# Patient Record
Sex: MALE | Race: WHITE | NOT HISPANIC OR LATINO | Employment: FULL TIME | URBAN - METROPOLITAN AREA
[De-identification: names, ages, dates, MRNs, and addresses within clinical notes are randomized per-mention and may not be internally consistent; named-entity substitution may affect disease eponyms.]

---

## 2017-01-17 DIAGNOSIS — K20.90 ESOPHAGITIS: ICD-10-CM

## 2017-01-20 ENCOUNTER — TELEPHONE (OUTPATIENT)
Dept: ELECTROPHYSIOLOGY | Facility: CLINIC | Age: 77
End: 2017-01-20

## 2017-01-20 DIAGNOSIS — I25.5 ISCHEMIC CARDIOMYOPATHY: ICD-10-CM

## 2017-01-20 DIAGNOSIS — E78.5 HYPERLIPIDEMIA, UNSPECIFIED HYPERLIPIDEMIA TYPE: Primary | ICD-10-CM

## 2017-01-20 RX ORDER — OMEPRAZOLE 40 MG/1
CAPSULE, DELAYED RELEASE ORAL
Qty: 30 CAPSULE | Refills: 1 | OUTPATIENT
Start: 2017-01-20

## 2017-01-20 RX ORDER — ATORVASTATIN CALCIUM 40 MG/1
40 TABLET, FILM COATED ORAL NIGHTLY
Qty: 30 TABLET | Refills: 3 | Status: SHIPPED | OUTPATIENT
Start: 2017-01-20

## 2017-01-20 RX ORDER — METOPROLOL TARTRATE 50 MG/1
50 TABLET ORAL 2 TIMES DAILY
Qty: 60 TABLET | Refills: 3 | Status: SHIPPED | OUTPATIENT
Start: 2017-01-20 | End: 2017-09-25 | Stop reason: SDUPTHER

## 2017-01-21 DIAGNOSIS — I48.92 ATRIAL FLUTTER, UNSPECIFIED TYPE: Primary | ICD-10-CM

## 2017-01-21 DIAGNOSIS — E78.5 HYPERLIPIDEMIA, UNSPECIFIED HYPERLIPIDEMIA TYPE: ICD-10-CM

## 2017-01-23 RX ORDER — METOPROLOL TARTRATE 50 MG/1
50 TABLET ORAL 2 TIMES DAILY
Qty: 60 TABLET | Refills: 3 | Status: SHIPPED | OUTPATIENT
Start: 2017-01-23 | End: 2017-03-16 | Stop reason: SDUPTHER

## 2017-01-23 RX ORDER — ATORVASTATIN CALCIUM 40 MG/1
40 TABLET, FILM COATED ORAL NIGHTLY
Qty: 30 TABLET | Refills: 3 | Status: SHIPPED | OUTPATIENT
Start: 2017-01-23 | End: 2017-03-16 | Stop reason: SDUPTHER

## 2017-01-26 DIAGNOSIS — F41.9 ANXIETY: ICD-10-CM

## 2017-01-26 RX ORDER — ALPRAZOLAM 0.25 MG/1
TABLET ORAL
Qty: 90 TABLET | Refills: 0 | OUTPATIENT
Start: 2017-01-26

## 2017-01-31 DIAGNOSIS — F41.9 ANXIETY: ICD-10-CM

## 2017-01-31 RX ORDER — ALPRAZOLAM 0.25 MG/1
TABLET ORAL
Qty: 90 TABLET | Refills: 0 | OUTPATIENT
Start: 2017-01-31

## 2017-02-15 ENCOUNTER — CLINICAL SUPPORT (OUTPATIENT)
Dept: SURGERY | Facility: CLINIC | Age: 77
End: 2017-02-15
Payer: MEDICARE

## 2017-02-15 DIAGNOSIS — K94.13 MALFUNCTIONING JEJUNOSTOMY TUBE: Primary | ICD-10-CM

## 2017-02-15 DIAGNOSIS — C15.9 ESOPHAGEAL ADENOCARCINOMA: ICD-10-CM

## 2017-02-15 PROCEDURE — 99024 POSTOP FOLLOW-UP VISIT: CPT | Mod: S$GLB,,, | Performed by: NURSE PRACTITIONER

## 2017-02-15 NOTE — PROGRESS NOTES
"Replaced 12 Fr feeding tube as tube cracked- no suture required. Pt is doing well- has gained weight, feeling well and energetic although he feels like his swallow is "clsoing up a bit". He is able to get liquids and some soft foods down- uses J tube daily for most of his nutritional needs. We discussed his last conversation w Dr. Caceres about him being a very high risk for surgery due to his heart issues and he stated that Dr. Caceres clearly spelled out all the risks and that he made the decision to not proceed w resection due to those risks. Mr. Monsalve has maintained his activity levels and is feeling and looking much better than his pre-op appts. No walker required today.     ASSESSMENT:    J tube replacement tolerated well.     PLAN:    1. Follow up PRN  2. Will send a note to Dr. Howard to ask her to follow him as he was a patient of Dr. Rae's.      "

## 2017-02-15 NOTE — Clinical Note
Hi Dr. Howard and Ofelia,  This patient was Dr. Rae's and he had a whopping case of radiation esophagitis and suffered quite a bit afterward. He is not a surgical candidate due to his heart function but he is very ambulatory and doing really well.  Could you see him Dr. Howard for follow up soon? I know you are extremely busy but I am very fond of him and he worked very hard to try to get to surgery. He hasn't had an appointment for a while w medical oncology. If there is another provider, please advise.  Thanks,  Pau

## 2017-03-06 DIAGNOSIS — F41.9 ANXIETY: ICD-10-CM

## 2017-03-06 RX ORDER — ALPRAZOLAM 0.25 MG/1
TABLET ORAL
Qty: 90 TABLET | Refills: 0 | Status: SHIPPED | OUTPATIENT
Start: 2017-03-06 | End: 2017-04-05 | Stop reason: SDUPTHER

## 2017-03-06 RX ORDER — ALPRAZOLAM 0.25 MG/1
TABLET ORAL
Qty: 90 TABLET | Refills: 0 | OUTPATIENT
Start: 2017-03-06

## 2017-03-06 NOTE — TELEPHONE ENCOUNTER
----- Message from Ghislaine Vick sent at 3/6/2017  3:44 PM CST -----  Contact: Pt  Pt called to speak to the nurse to request an rx refill for alprazolam (XANAX) 0.25 MG tablet and would the rx sent to [facility] on [address] [phone].    Pt can be reached at [phone].    Thanks

## 2017-03-16 ENCOUNTER — LAB VISIT (OUTPATIENT)
Dept: LAB | Facility: HOSPITAL | Age: 77
End: 2017-03-16
Payer: MEDICARE

## 2017-03-16 ENCOUNTER — OFFICE VISIT (OUTPATIENT)
Dept: HEMATOLOGY/ONCOLOGY | Facility: CLINIC | Age: 77
End: 2017-03-16
Payer: MEDICARE

## 2017-03-16 VITALS
RESPIRATION RATE: 18 BRPM | WEIGHT: 170.88 LBS | TEMPERATURE: 98 F | HEART RATE: 66 BPM | DIASTOLIC BLOOD PRESSURE: 60 MMHG | SYSTOLIC BLOOD PRESSURE: 94 MMHG | OXYGEN SATURATION: 98 % | BODY MASS INDEX: 23.14 KG/M2 | HEIGHT: 72 IN

## 2017-03-16 DIAGNOSIS — C15.5 MALIGNANT NEOPLASM OF LOWER THIRD OF ESOPHAGUS: ICD-10-CM

## 2017-03-16 DIAGNOSIS — Z85.01 HISTORY OF ESOPHAGEAL CANCER: Primary | ICD-10-CM

## 2017-03-16 LAB
ALBUMIN SERPL BCP-MCNC: 3.3 G/DL
ALP SERPL-CCNC: 97 U/L
ALT SERPL W/O P-5'-P-CCNC: 24 U/L
ANION GAP SERPL CALC-SCNC: 6 MMOL/L
AST SERPL-CCNC: 30 U/L
BILIRUB SERPL-MCNC: 0.9 MG/DL
BUN SERPL-MCNC: 26 MG/DL
CALCIUM SERPL-MCNC: 9.1 MG/DL
CHLORIDE SERPL-SCNC: 104 MMOL/L
CO2 SERPL-SCNC: 28 MMOL/L
CREAT SERPL-MCNC: 0.8 MG/DL
ERYTHROCYTE [DISTWIDTH] IN BLOOD BY AUTOMATED COUNT: 14.4 %
EST. GFR  (AFRICAN AMERICAN): >60 ML/MIN/1.73 M^2
EST. GFR  (NON AFRICAN AMERICAN): >60 ML/MIN/1.73 M^2
GLUCOSE SERPL-MCNC: 122 MG/DL
HCT VFR BLD AUTO: 41.7 %
HGB BLD-MCNC: 13.8 G/DL
MCH RBC QN AUTO: 31.7 PG
MCHC RBC AUTO-ENTMCNC: 33.1 %
MCV RBC AUTO: 96 FL
NEUTROPHILS # BLD AUTO: 5.1 K/UL
PLATELET # BLD AUTO: 140 K/UL
PMV BLD AUTO: 10.4 FL
POTASSIUM SERPL-SCNC: 4.7 MMOL/L
PROT SERPL-MCNC: 6.6 G/DL
RBC # BLD AUTO: 4.36 M/UL
SODIUM SERPL-SCNC: 138 MMOL/L
WBC # BLD AUTO: 6.71 K/UL

## 2017-03-16 PROCEDURE — 3078F DIAST BP <80 MM HG: CPT | Mod: S$GLB,,, | Performed by: INTERNAL MEDICINE

## 2017-03-16 PROCEDURE — 1126F AMNT PAIN NOTED NONE PRSNT: CPT | Mod: S$GLB,,, | Performed by: INTERNAL MEDICINE

## 2017-03-16 PROCEDURE — 3074F SYST BP LT 130 MM HG: CPT | Mod: S$GLB,,, | Performed by: INTERNAL MEDICINE

## 2017-03-16 PROCEDURE — 1157F ADVNC CARE PLAN IN RCRD: CPT | Mod: S$GLB,,, | Performed by: INTERNAL MEDICINE

## 2017-03-16 PROCEDURE — 1160F RVW MEDS BY RX/DR IN RCRD: CPT | Mod: S$GLB,,, | Performed by: INTERNAL MEDICINE

## 2017-03-16 PROCEDURE — 1159F MED LIST DOCD IN RCRD: CPT | Mod: S$GLB,,, | Performed by: INTERNAL MEDICINE

## 2017-03-16 PROCEDURE — 99999 PR PBB SHADOW E&M-EST. PATIENT-LVL III: CPT | Mod: PBBFAC,,, | Performed by: INTERNAL MEDICINE

## 2017-03-16 PROCEDURE — 99214 OFFICE O/P EST MOD 30 MIN: CPT | Mod: S$GLB,,, | Performed by: INTERNAL MEDICINE

## 2017-03-16 NOTE — PROGRESS NOTES
"Subjective:       Patient ID: Roni Monsalve Jr. is a 77 y.o. male.    Chief Complaint: Malignant neoplasm of lower third of esophagus  Patient is a long time smoker.  Had an EGD 1/28/2016 which showed him to have a 2.5 cms submucosal mass at the GE junction. This was biopsied and was consistent with an infiltrating poorly differentiated adenocarcinoma. An EUS done subsequently showed a T3 lesion and no significant lymphadenopathy.  Received concomitant chemotherapy (weekly carboplatin and paclitaxel) and XRT.  Surgery was not done due to cardiac issues.  EGD revealed "Normal oropharynx. One mild to moderate stenosis was found 43 cm from the incisors. This measured 1 cm (in length) and was traversed. An esophageal stent was not placed due to the high likelihood of migration. Diffuse moderate mucosal changes characterized by congestion, scalloping and ulceration were found in the lower third of the esophagus form 44 cm to 39 cm.  Last PET scan in 10/16 revealed "Distal esophageal activity fairly similar.  This could be primary neoplasm and or esophagitis.  No metastases are seen".    HPIHe feels well and receives nutrition via J tube. He notes that he tries to drink a boost or 2/day. He also notes that he has no taste and smell.  He has not had scans after October 2016. He saw Dr. Rae in 10/16 and was recommended to undergo CT scans which he has not done so far. He notes that he knows that nothing much can be done if he can cancer.      Review of Systems   Constitutional: Negative for appetite change, fatigue and unexpected weight change.   HENT: Negative for mouth sores.    Eyes: Negative for visual disturbance.   Respiratory: Negative for cough and shortness of breath.    Cardiovascular: Negative for chest pain.   Gastrointestinal: Negative for abdominal pain and diarrhea.   Genitourinary: Negative for frequency.   Musculoskeletal: Negative for back pain.   Skin: Negative for rash.   Neurological: Negative for " headaches.   Hematological: Negative for adenopathy.   Psychiatric/Behavioral: The patient is not nervous/anxious.    All other systems reviewed and are negative.      PMFSH: all information reviewed and updated as relevant to today's visit  Objective:      Physical Exam   Constitutional: He is oriented to person, place, and time. He appears well-developed and well-nourished.   HENT:   Mouth/Throat: No oropharyngeal exudate.   Cardiovascular: Normal rate and normal heart sounds.    Pulmonary/Chest: Effort normal and breath sounds normal. He has no wheezes.   Abdominal: Soft. Bowel sounds are normal. There is no tenderness.   Musculoskeletal: He exhibits no edema or tenderness.   Lymphadenopathy:     He has no cervical adenopathy.   Neurological: He is alert and oriented to person, place, and time. Coordination normal.   Skin: Skin is warm and dry. No rash noted.   Psychiatric: He has a normal mood and affect. Judgment and thought content normal.   Vitals reviewed.      LABS:  WBC   Date Value Ref Range Status   03/16/2017 6.71 3.90 - 12.70 K/uL Final     Hemoglobin   Date Value Ref Range Status   03/16/2017 13.8 (L) 14.0 - 18.0 g/dL Final     Hematocrit   Date Value Ref Range Status   03/16/2017 41.7 40.0 - 54.0 % Final     Platelets   Date Value Ref Range Status   03/16/2017 140 (L) 150 - 350 K/uL Final     Gran #   Date Value Ref Range Status   03/16/2017 5.1 1.8 - 7.7 K/uL Final     Comment:     The ANC is based on a white cell differential from an   automated cell counter. It has not been microscopically   reviewed for the presence of abnormal cells. Clinical   correlation is required.         Chemistry        Component Value Date/Time     10/18/2016 1422    K 4.6 10/18/2016 1422     10/18/2016 1422    CO2 28 03/16/2017 0919    BUN 27 (H) 10/18/2016 1422    CREATININE 0.7 10/18/2016 1422    GLU 95 10/18/2016 1422        Component Value Date/Time    CALCIUM 9.0 10/18/2016 1422    ALKPHOS 97 10/18/2016  1422    AST 33 10/18/2016 1422    ALT 33 10/18/2016 1422    BILITOT 0.7 10/18/2016 1422          Assessment:       1. History of esophageal cancer        Plan:        1. Discussed with him that he may not be able to undergo surgery but he maybe able to undergo chemo for recurrence. Recommended him to undergo PET scan as he cannot drink contrast. He notes that he will talk to his wife and let me know./    Further follow-up will be based on that    Above care plan was discussed with patient and all questions were addressed to his satisfaction

## 2017-03-22 ENCOUNTER — TELEPHONE (OUTPATIENT)
Dept: HEMATOLOGY/ONCOLOGY | Facility: CLINIC | Age: 77
End: 2017-03-22

## 2017-03-22 DIAGNOSIS — Z85.01 HISTORY OF ESOPHAGEAL CANCER: Primary | ICD-10-CM

## 2017-03-22 NOTE — TELEPHONE ENCOUNTER
----- Message from Justin Cho sent at 3/22/2017  2:19 PM CDT -----  Contact: PT  Would like a call to speak with physician, regarding his medication.     Call: [phone]

## 2017-03-22 NOTE — TELEPHONE ENCOUNTER
Returned call to patient, who is calling to state he hasn't worn his home oxygen since last year, and he is needing a DC home oxygen order to present to the company (so they will  equipment).     Nurse informed patient she would contact him back after speaking with dr boswell.  Patient voiced understanding.      Message routed to dr boswell

## 2017-03-23 ENCOUNTER — TELEPHONE (OUTPATIENT)
Dept: HEMATOLOGY/ONCOLOGY | Facility: CLINIC | Age: 77
End: 2017-03-23

## 2017-03-23 NOTE — TELEPHONE ENCOUNTER
Received consult from Dr. Howard re: d/c'ing patient's home oxygen. Downloaded form from [employer] website and completed it. Faxed this form along with the order to d/c home oxygen and patient's demographic information to [employer] at [phone]. Called patient at [phone] and discussed arrangements with him. Explained that [employer]'s staff will process the order and notify the equipment company, and patient should receive call from the equipment company staff to schedule  of the oxygen equipment from his home. Advised patient to call me back by the middle of next week if he doesn't receive call(s) from the equipment company/[employer], and patient stated understanding. He reported no other needs or concerns at this time.

## 2017-04-05 DIAGNOSIS — F41.9 ANXIETY: ICD-10-CM

## 2017-04-05 RX ORDER — ALPRAZOLAM 0.25 MG/1
TABLET ORAL
Qty: 90 TABLET | Refills: 0 | Status: SHIPPED | OUTPATIENT
Start: 2017-04-05 | End: 2017-05-04 | Stop reason: SDUPTHER

## 2017-04-18 ENCOUNTER — TELEPHONE (OUTPATIENT)
Dept: SURGERY | Facility: CLINIC | Age: 77
End: 2017-04-18

## 2017-04-18 ENCOUNTER — TELEPHONE (OUTPATIENT)
Dept: HEMATOLOGY/ONCOLOGY | Facility: CLINIC | Age: 77
End: 2017-04-18

## 2017-04-18 NOTE — TELEPHONE ENCOUNTER
----- Message from Magaly Duggan sent at 4/18/2017  9:07 AM CDT -----  Contact: Self  Pt needs Nurse to call him [phone].

## 2017-04-18 NOTE — TELEPHONE ENCOUNTER
----- Message from Stalin Zayas sent at 4/18/2017 12:30 PM CDT -----  Patient states that he needs to speak with nurse in ref to which lubricant he can use to reinsert in tube;or if he could stop by to pick something up//please call back at [phone]//thank you

## 2017-04-18 NOTE — TELEPHONE ENCOUNTER
Returned call to patient, who is calling to check the status of his DC home oxygen orders.  Nurse informed patient dr boswell wrote the orders and nurse would follow up with nj oakley.  Patient voiced understanding.    Message routed to nj oakley

## 2017-04-19 NOTE — TELEPHONE ENCOUNTER
----- Message from Justin Cho sent at 4/19/2017 12:57 PM CDT -----  Contact: PT  Need clinical notes and progress note faxed over for his J Tube. He need it to be faxed before 2pm, before closing time.     He need it in order to receive his food. Please fax it over today for patient.     Fax: [phone] ( Attention: Denis)      Please call patient once it has been completed, [phone]

## 2017-05-04 DIAGNOSIS — F41.9 ANXIETY: ICD-10-CM

## 2017-05-04 RX ORDER — ALPRAZOLAM 0.25 MG/1
TABLET ORAL
Qty: 90 TABLET | Refills: 0 | Status: SHIPPED | OUTPATIENT
Start: 2017-05-04

## 2017-05-05 ENCOUNTER — TELEPHONE (OUTPATIENT)
Dept: HEMATOLOGY/ONCOLOGY | Facility: CLINIC | Age: 77
End: 2017-05-05

## 2017-05-05 ENCOUNTER — DOCUMENTATION ONLY (OUTPATIENT)
Dept: HEMATOLOGY/ONCOLOGY | Facility: CLINIC | Age: 77
End: 2017-05-05

## 2017-05-05 NOTE — TELEPHONE ENCOUNTER
----- Message from Elle Nunez sent at 5/5/2017  3:00 PM CDT -----  Contact: Cammy/[employer] [phone]  Ms. Cammy called and would like to speak to Ofelia in reference to a prior authorization.    Ms. Chawla can be reached at [phone]      Thank you

## 2017-05-05 NOTE — TELEPHONE ENCOUNTER
----- Message from Justin Cho sent at 5/5/2017  9:31 AM CDT -----  Contact: [facility]   alprazolam (XANAX) 0.25 MG tablet requires a PA.    She faxed over the information and need it to be faxed back to her today before 12pm.    Call: [phone]  Fax: [phone]

## 2017-05-05 NOTE — TELEPHONE ENCOUNTER
Provided samuel with previous medications tried for anxiety---none that nurse is aware of.  Dr. Howard is aware of risk of medication.

## 2017-05-05 NOTE — TELEPHONE ENCOUNTER
Left message for patient informing him xanax is not covered by his insurance anymore--as it is considered high risk for him.  Advised patient to contact his PCP or psychiatrist so that something else may be prescribed if he can't afford the out of pocket cost.

## 2017-05-11 DIAGNOSIS — I48.92 ATRIAL FLUTTER, UNSPECIFIED TYPE: Primary | ICD-10-CM

## 2017-05-11 RX ORDER — AMIODARONE HYDROCHLORIDE 200 MG/1
200 TABLET ORAL DAILY
Qty: 90 TABLET | Refills: 3 | Status: SHIPPED | OUTPATIENT
Start: 2017-05-11

## 2017-05-19 ENCOUNTER — OFFICE VISIT (OUTPATIENT)
Dept: SURGERY | Facility: CLINIC | Age: 77
End: 2017-05-19
Payer: MEDICARE

## 2017-05-19 ENCOUNTER — HOSPITAL ENCOUNTER (EMERGENCY)
Facility: HOSPITAL | Age: 77
Discharge: HOME | End: 2017-05-19
Attending: EMERGENCY MEDICINE
Payer: MEDICARE

## 2017-05-19 ENCOUNTER — TELEPHONE (OUTPATIENT)
Dept: HEMATOLOGY/ONCOLOGY | Facility: CLINIC | Age: 77
End: 2017-05-19

## 2017-05-19 ENCOUNTER — TELEPHONE (OUTPATIENT)
Dept: SURGERY | Facility: CLINIC | Age: 77
End: 2017-05-19

## 2017-05-19 VITALS
WEIGHT: 170 LBS | BODY MASS INDEX: 23.03 KG/M2 | TEMPERATURE: 97 F | HEART RATE: 118 BPM | OXYGEN SATURATION: 95 % | HEIGHT: 72 IN | RESPIRATION RATE: 16 BRPM | DIASTOLIC BLOOD PRESSURE: 63 MMHG | TEMPERATURE: 98 F | HEART RATE: 62 BPM | BODY MASS INDEX: 22.81 KG/M2 | WEIGHT: 168.44 LBS | DIASTOLIC BLOOD PRESSURE: 70 MMHG | SYSTOLIC BLOOD PRESSURE: 103 MMHG | HEIGHT: 72 IN | SYSTOLIC BLOOD PRESSURE: 116 MMHG

## 2017-05-19 DIAGNOSIS — M54.9 BACK PAIN: ICD-10-CM

## 2017-05-19 DIAGNOSIS — R53.1 WEAKNESS GENERALIZED: ICD-10-CM

## 2017-05-19 DIAGNOSIS — K94.13 MALFUNCTIONING JEJUNOSTOMY TUBE: Primary | ICD-10-CM

## 2017-05-19 DIAGNOSIS — T85.528A JEJUNOSTOMY TUBE FELL OUT: Primary | ICD-10-CM

## 2017-05-19 LAB
ALBUMIN SERPL BCP-MCNC: 3.4 G/DL
ALP SERPL-CCNC: 119 U/L
ALT SERPL W/O P-5'-P-CCNC: 25 U/L
ANION GAP SERPL CALC-SCNC: 14 MMOL/L
ANION GAP SERPL CALC-SCNC: 14 MMOL/L
AST SERPL-CCNC: 40 U/L
BASOPHILS # BLD AUTO: 0.01 K/UL
BASOPHILS NFR BLD: 0.1 %
BILIRUB SERPL-MCNC: 1.8 MG/DL
BUN SERPL-MCNC: 25 MG/DL
BUN SERPL-MCNC: 25 MG/DL
CALCIUM SERPL-MCNC: 9.6 MG/DL
CALCIUM SERPL-MCNC: 9.6 MG/DL
CHLORIDE SERPL-SCNC: 108 MMOL/L
CHLORIDE SERPL-SCNC: 108 MMOL/L
CO2 SERPL-SCNC: 22 MMOL/L
CO2 SERPL-SCNC: 22 MMOL/L
CREAT SERPL-MCNC: 0.9 MG/DL
CREAT SERPL-MCNC: 0.9 MG/DL
DIFFERENTIAL METHOD: ABNORMAL
EOSINOPHIL # BLD AUTO: 0 K/UL
EOSINOPHIL NFR BLD: 0.5 %
ERYTHROCYTE [DISTWIDTH] IN BLOOD BY AUTOMATED COUNT: 14.4 %
EST. GFR  (AFRICAN AMERICAN): >60 ML/MIN/1.73 M^2
EST. GFR  (AFRICAN AMERICAN): >60 ML/MIN/1.73 M^2
EST. GFR  (NON AFRICAN AMERICAN): >60 ML/MIN/1.73 M^2
EST. GFR  (NON AFRICAN AMERICAN): >60 ML/MIN/1.73 M^2
GLUCOSE SERPL-MCNC: 94 MG/DL
GLUCOSE SERPL-MCNC: 94 MG/DL
HCT VFR BLD AUTO: 45.7 %
HGB BLD-MCNC: 15.1 G/DL
LYMPHOCYTES # BLD AUTO: 0.8 K/UL
LYMPHOCYTES NFR BLD: 10.1 %
MCH RBC QN AUTO: 31.9 PG
MCHC RBC AUTO-ENTMCNC: 33 %
MCV RBC AUTO: 97 FL
MONOCYTES # BLD AUTO: 0.6 K/UL
MONOCYTES NFR BLD: 8.1 %
NEUTROPHILS # BLD AUTO: 6.4 K/UL
NEUTROPHILS NFR BLD: 80.8 %
PLATELET # BLD AUTO: 129 K/UL
PMV BLD AUTO: 10.3 FL
POTASSIUM SERPL-SCNC: 4.4 MMOL/L
POTASSIUM SERPL-SCNC: 4.4 MMOL/L
PROT SERPL-MCNC: 7.1 G/DL
RBC # BLD AUTO: 4.73 M/UL
SODIUM SERPL-SCNC: 144 MMOL/L
SODIUM SERPL-SCNC: 144 MMOL/L
WBC # BLD AUTO: 7.92 K/UL

## 2017-05-19 PROCEDURE — 99024 POSTOP FOLLOW-UP VISIT: CPT | Mod: S$GLB,,, | Performed by: NURSE PRACTITIONER

## 2017-05-19 PROCEDURE — 25500020 PHARM REV CODE 255: Performed by: EMERGENCY MEDICINE

## 2017-05-19 PROCEDURE — 85025 COMPLETE CBC W/AUTO DIFF WBC: CPT

## 2017-05-19 PROCEDURE — 96361 HYDRATE IV INFUSION ADD-ON: CPT

## 2017-05-19 PROCEDURE — 99285 EMERGENCY DEPT VISIT HI MDM: CPT | Mod: ,,, | Performed by: EMERGENCY MEDICINE

## 2017-05-19 PROCEDURE — 96365 THER/PROPH/DIAG IV INF INIT: CPT

## 2017-05-19 PROCEDURE — 96360 HYDRATION IV INFUSION INIT: CPT

## 2017-05-19 PROCEDURE — 80053 COMPREHEN METABOLIC PANEL: CPT

## 2017-05-19 PROCEDURE — 99999 PR PBB SHADOW E&M-EST. PATIENT-LVL III: CPT | Mod: PBBFAC,,, | Performed by: NURSE PRACTITIONER

## 2017-05-19 PROCEDURE — 99284 EMERGENCY DEPT VISIT MOD MDM: CPT | Mod: 25

## 2017-05-19 PROCEDURE — 96366 THER/PROPH/DIAG IV INF ADDON: CPT

## 2017-05-19 PROCEDURE — 25000003 PHARM REV CODE 250: Performed by: STUDENT IN AN ORGANIZED HEALTH CARE EDUCATION/TRAINING PROGRAM

## 2017-05-19 RX ORDER — DEXTROSE MONOHYDRATE AND SODIUM CHLORIDE 5; .45 G/100ML; G/100ML
1000 INJECTION, SOLUTION INTRAVENOUS
Status: COMPLETED | OUTPATIENT
Start: 2017-05-19 | End: 2017-05-19

## 2017-05-19 RX ADMIN — IOHEXOL 75 ML: 350 INJECTION, SOLUTION INTRAVENOUS at 08:05

## 2017-05-19 RX ADMIN — DEXTROSE AND SODIUM CHLORIDE 1000 ML: 5; .45 INJECTION, SOLUTION INTRAVENOUS at 12:05

## 2017-05-19 RX ADMIN — IOHEXOL 50 ML: 350 INJECTION, SOLUTION INTRAVENOUS at 04:05

## 2017-05-19 NOTE — ED AVS SNAPSHOT
[facility]  [address] [address]               Roni Monsalve JrMadeleine   2017 10:52 AM   ED    Description:  Male : 1940   Department:  [facility]           Your Care was Coordinated By:     Provider Role From To    Sixto Lenz MD Attending Provider 17 1115 --    BURT Curiel Physician Assistant 17 1105 17 1109    Terrell Adler MD Resident 17 1117 --    Johnny Jones MD Resident 17 191 --      Reason for Visit     J Tube Problem           Diagnoses this Visit        Comments    Jejunostomy tube fell out    -  Primary     Back pain           ED Disposition     ED Disposition Condition Comment    Discharge  Patient with J-tube replaced; no other complaints. Pt discharged as CT L-spine indicates no acute findings requiring emergent intervention.            To Do List           Follow-up Information     Schedule an appointment as soon as possible for a visit with YADIEL Reyna MD.    Specialty:  Family Medicine    Contact information:    [address]  [facility] [address]  [phone]          Follow up with Nena Howard MD.    Specialties:  Hematology and Oncology, Hematology    Contact information:    [address]  [place] [id number] [phone]          Follow up with [facility].    Specialty:  Emergency Medicine    Why:  If symptoms worsen    Contact information:    [address] [id number] [phone]        Call Nena Howard MD.    Specialties:  Hematology and Oncology, Hematology    Why:  If symptoms worsen; results of MRI spine    Contact information:    [address]  [place] [id number] [phone]        [facility] On Call     [facility] Nurse Care Line -  Assistance  Unless otherwise directed by your provider, please contact [facility], our nurse care line that is available for   assistance.     Registered nurses in the [facility] provide: appointment scheduling, clinical advisement, health education, and other advisory services.  Call: [phone] (toll free)               Medications           Message regarding Medications     Verify the changes and/or additions to your medication regime listed below are the same as discussed with your clinician today.  If any of these changes or additions are incorrect, please notify your healthcare provider.        These medications were administered today        Dose Freq    dextrose 5 % and 0.45 % NaCl infusion 1,000 mL ED 1 Time    Sig: Inject 1,000 mLs into the vein ED 1 Time.    Class: Normal    Route: Intravenous    omnipaque 350 iohexol 50 mL 50 mL ED 1 Time    Si mLs by Per J Tube route ED 1 Time.    Class: Normal    Route: Per J Tube    omnipaque 350 iohexol 75 mL 75 mL IMG once as needed    Sig: Inject 75 mLs into the vein ONCE PRN for contrast.    Class: Normal    Route: Intravenous           Verify that the below list of medications is an accurate representation of the medications you are currently taking.  If none reported, the list may be blank. If incorrect, please contact your healthcare provider. Carry this list with you in case of emergency.           Current Medications     alprazolam (XANAX) 0.25 MG tablet TAKE 1 TABLET BY MOUTH 3 TIMES A DAY AS NEEDED FOR ANXIETY    amiodarone (PACERONE) 200 MG Tab Take 1 tablet (200 mg total) by mouth once daily.    aspirin (ECOTRIN) 81 MG EC tablet Take 81 mg by mouth once daily.    atorvastatin (LIPITOR) 40 MG tablet Take 1 tablet (40 mg total) by mouth every evening.    DIPHENHYDRAMINE HCL (DUKE'S SOLUTION) Take 10 mLs by mouth 4 (four) times daily.    metoprolol tartrate (LOPRESSOR) 50 MG tablet Take 1 tablet (50 mg total) by mouth 2 (two) times daily.    nystatin (MYCOSTATIN) cream     omeprazole (PRILOSEC) 40 MG capsule     ondansetron (ZOFRAN) 4 MG tablet TAKE 1 TABLET BY  MOUTH EVERY 12 HOURS AS NEEDED FOR NAUSEA    psyllium (METAMUCIL) packet Take 1 packet by mouth 3 (three) times daily.           Clinical Reference Information           Your Vitals Were     BP Pulse Temp Resp Height Weight    144/56 62 97.9 °F (36.6 °C) (Oral) 16 6' (1.829 m) 77.1 kg (170 lb)    SpO2 BMI             94% 23.06 kg/m2         Allergies as of 5/19/2017        Reactions    Penicillins Hives    Streptomycin Hives    Sulfa (Sulfonamide Antibiotics) Hives      Immunizations Administered on Date of Encounter - 5/19/2017     None      ED Micro, Lab, POCT     Start Ordered       Status Ordering Provider    05/19/17 1227 05/19/17 1226  Comprehensive metabolic panel  Add-on      Completed     05/19/17 1156 05/19/17 1155  CBC auto differential  STAT      Final result     05/19/17 1156 05/19/17 1155  Basic metabolic panel  STAT      Final result     05/19/17 1155 05/19/17 1155  Comprehensive metabolic panel  Once      Final result       ED Imaging Orders     Start Ordered       Status Ordering Provider    05/19/17 1905 05/19/17 1904  CT Lumbar Spine With Contrast  1 time imaging      Final result     05/19/17 1557 05/19/17 1557  X-Ray Abdomen AP 1 View (KUB)  1 time imaging      Final result     05/19/17 1554 05/19/17 1554    1 time imaging,   Status:  Canceled      Canceled         Discharge Instructions         General Neck and Back Pain    Both neck and back pain are usually caused by injury to the muscles or ligaments of the spine. Sometimes the disks that separate each bone of the spine may cause pain by pressing on a nearby nerve. Back and neck pain may appear after a sudden twisting or bending force (such as in a car accident), or sometimes after a simple awkward movement. In either case, muscle spasm is often present and adds to the pain.  Acute neck and back pain usually gets better in 1 to 2 weeks. Pain related to disk disease, arthritis in the spinal joints or spinal stenosis (narrowing of the spinal  canal) can become chronic and last for months or years.  Back and neck pain are common problems. Most people feel better in 1 or 2 weeks, and most of the rest in 1 to 2 months. Most people can remain active.  People experience and describe pain differently.  · Pain can be sharp, stabbing, shooting, aching, cramping, or burning  · Movement, standing, bending, lifting, sitting, or walking may worsen the pain  · Pain can be localized to one spot or area, or it can be more generalized  · Pain can spread or radiate upwards, downwards, to the front, or go down your arms  · Muscle spasm may occur.  Most of the time mechanical problems with the muscles or spine cause the pain. it is usually caused by an injury, whether known or not, to the muscles or ligaments. While illnesses can cause back pain, it is usually not caused by a serious illness. Pain is usually related to physical activity, whether sports, exercise, work, or normal activity. Sometimes it can occur without an identifiable cause. This can happen simply by stretching or moving wrong, without noting pain at the time. Other causes include:  · Overexertion, lifting, pushing, pulling incorrectly or too aggressively.  · Sudden twisting, bending or stretching from an accident (car or fall), or accidental movement.  · Poor posture  · Poor conditioning, lack of regular exercise  · Spinal disc disease or arthritis  · Stress  · Pregnancy, or illness like appendicitis, bladder or kidney infection, pelvic infections   Home care  · For neck pain: Use a comfortable pillow that supports the head and keeps the spine in a neutral position. The position of the head should not be tilted forward or backward.  · When in bed, try to find a position of comfort. A firm mattress is best. Try lying flat on your back with pillows under your knees. You can also try lying on your side with your knees bent up towards your chest and a pillow between your knees.  · At first, do not try to  stretch out the sore spots. If there is a strain, it is not like the good soreness you get after exercising without an injury. In this case, stretching may make it worse.  · Avoid prolonged sitting, long car rides or travel. This puts more stress on the lower back than standing or walking.  · During the first 24 to 72 hours after an injury, apply an ice pack to the painful area for 20 minutes and then remove it for 20 minutes over a period of 60 to 90 minutes or several times a day.   · You can alternate ice and heat therapies. Talk with your healthcare provider about the best treatment for your back or neck pain. As a safety precaution, do not use a heating pad at bedtime. Sleeping with a heating pad can lead to skin burns or tissue damage.  · Therapeutic massage can help relax the back and neck muscles without stretching them.  · Be aware of safe lifting methods and do not lift anything over 15 pounds until all the pain is gone.  Medications  Talk to your healthcare provider before using medicine, especially if you have other medical problems or are taking other medicines.  · You may use over-the-counter medicine to control pain, unless another pain medicine was prescribed. If you have chronic conditions like diabetes, liver or kidney disease, stomach ulcers,  gastrointestinal bleeding, or are taking blood thinner medicines.  · Be careful if you are given pain medicines, narcotics, or medicine for muscle spasm. They can cause drowsiness, and can affect your coordination, reflexes, and judgment. Do not drive or operate heavy machinery.  Follow-up care  Follow up with your healthcare provider, or as advised. Physical therapy or further tests may be needed.  If X-rays were taken, you will be notified of any new findings that may affect your care.  Call 911  Seek emergency medical care if any of the following occur:  · Trouble breathing  · Confusion  · Very drowsy or trouble awakening  · Fainting or loss of  consciousness  · Rapid or very slow heart rate  · Loss of bowel or bladder control  When to seek medical advice  Call your healthcare provider right away if any of these occur:  · Pain becomes worse or spreads into your arms or legs  · Weakness, numbness or pain in one or both arms or legs  · Numbness in the groin area  · Difficulty walking  · Fever of 100.4ºF (38ºC) or higher, or as directed by your healthcare provider  Date Last Reviewed: 7/1/2016 © 2000-2016 [employer] [address] [place] [address]. All rights reserved. This information is not intended as a substitute for professional medical care. Always follow your healthcare professional's instructions.          Discharge References/Attachments     FEEDING TUBE CARE, GASTROSTOMY: FLUSHING (ENGLISH)      Smoking Cessation     If you would like to quit smoking:   You may be eligible for free services if you are a [place] resident and started smoking cigarettes before September 1, 1988.  Call the Smoking Cessation Trust ([facility] [place]) toll free at (975) [phone] or [phone] [phone].   Call 1-800-QUIT-NOW if you do not meet the above criteria.   Contact us via email: [email or web address]   View our website for more information: [email or web address]         [facility] complies with applicable Federal civil rights laws and does not discriminate on the basis of race, color, national origin, age, disability, or sex.        Language Assistance Services     ATTENTION: Language assistance services are available, free of charge. Please call [phone].      ATENCIÓN: Si habla español, tiene a blevins disposición servicios gratuitos de asistencia lingüística. Llame al [phone].     CHÚ Ý: N?u b?n nói Ti?ng Vi?t, có các d?ch v? h? tr? ngôn ng? mi?n phí dành cho b?n. G?i s? [phone].

## 2017-05-19 NOTE — ED NOTES
LOC: The patient is awake, alert and aware of environment with an appropriate affect, the patient is oriented x 4 and speaking appropriately.  APPEARANCE: Patient resting comfortably and in no acute distress, patient is clean and well groomed, patient's clothing is properly fastened.  SKIN: The skin is warm and dry, color consistent with ethnicity, patient has normal skin turgor and moist mucus membranes, skin intact, no breakdown or bruising noted.  MUSCULOSKELETAL: Patient moving all extremities well, no obvious swelling or deformities noted.  RESPIRATORY: Airway is open and patent, respirations are spontaneous, patient has a normal effort and rate, no accessory muscle use noted.  CARDIAC: Patient has a normal rate and rhythm, no periphreal edema noted, capillary refill < 3 seconds.  ABDOMEN: Soft and non tender to palpation, no distention noted. Patient has LUQ J-tube insertion site.   NEUROLOGIC: eyes open spontaneously, behavior appropriate to situation, follows commands, facial expression symmetrical, bilateral hand grasp equal and even, purposeful motor response noted, normal sensation in all extremities when touched with a finger.

## 2017-05-19 NOTE — DISCHARGE INSTRUCTIONS
General Neck and Back Pain    Both neck and back pain are usually caused by injury to the muscles or ligaments of the spine. Sometimes the disks that separate each bone of the spine may cause pain by pressing on a nearby nerve. Back and neck pain may appear after a sudden twisting or bending force (such as in a car accident), or sometimes after a simple awkward movement. In either case, muscle spasm is often present and adds to the pain.  Acute neck and back pain usually gets better in 1 to 2 weeks. Pain related to disk disease, arthritis in the spinal joints or spinal stenosis (narrowing of the spinal canal) can become chronic and last for months or years.  Back and neck pain are common problems. Most people feel better in 1 or 2 weeks, and most of the rest in 1 to 2 months. Most people can remain active.  People experience and describe pain differently.  · Pain can be sharp, stabbing, shooting, aching, cramping, or burning  · Movement, standing, bending, lifting, sitting, or walking may worsen the pain  · Pain can be localized to one spot or area, or it can be more generalized  · Pain can spread or radiate upwards, downwards, to the front, or go down your arms  · Muscle spasm may occur.  Most of the time mechanical problems with the muscles or spine cause the pain. it is usually caused by an injury, whether known or not, to the muscles or ligaments. While illnesses can cause back pain, it is usually not caused by a serious illness. Pain is usually related to physical activity, whether sports, exercise, work, or normal activity. Sometimes it can occur without an identifiable cause. This can happen simply by stretching or moving wrong, without noting pain at the time. Other causes include:  · Overexertion, lifting, pushing, pulling incorrectly or too aggressively.  · Sudden twisting, bending or stretching from an accident (car or fall), or accidental movement.  · Poor posture  · Poor conditioning, lack of regular  exercise  · Spinal disc disease or arthritis  · Stress  · Pregnancy, or illness like appendicitis, bladder or kidney infection, pelvic infections   Home care  · For neck pain: Use a comfortable pillow that supports the head and keeps the spine in a neutral position. The position of the head should not be tilted forward or backward.  · When in bed, try to find a position of comfort. A firm mattress is best. Try lying flat on your back with pillows under your knees. You can also try lying on your side with your knees bent up towards your chest and a pillow between your knees.  · At first, do not try to stretch out the sore spots. If there is a strain, it is not like the good soreness you get after exercising without an injury. In this case, stretching may make it worse.  · Avoid prolonged sitting, long car rides or travel. This puts more stress on the lower back than standing or walking.  · During the first 24 to 72 hours after an injury, apply an ice pack to the painful area for 20 minutes and then remove it for 20 minutes over a period of 60 to 90 minutes or several times a day.   · You can alternate ice and heat therapies. Talk with your healthcare provider about the best treatment for your back or neck pain. As a safety precaution, do not use a heating pad at bedtime. Sleeping with a heating pad can lead to skin burns or tissue damage.  · Therapeutic massage can help relax the back and neck muscles without stretching them.  · Be aware of safe lifting methods and do not lift anything over 15 pounds until all the pain is gone.  Medications  Talk to your healthcare provider before using medicine, especially if you have other medical problems or are taking other medicines.  · You may use over-the-counter medicine to control pain, unless another pain medicine was prescribed. If you have chronic conditions like diabetes, liver or kidney disease, stomach ulcers,  gastrointestinal bleeding, or are taking blood thinner  medicines.  · Be careful if you are given pain medicines, narcotics, or medicine for muscle spasm. They can cause drowsiness, and can affect your coordination, reflexes, and judgment. Do not drive or operate heavy machinery.  Follow-up care  Follow up with your healthcare provider, or as advised. Physical therapy or further tests may be needed.  If X-rays were taken, you will be notified of any new findings that may affect your care.  Call 911  Seek emergency medical care if any of the following occur:  · Trouble breathing  · Confusion  · Very drowsy or trouble awakening  · Fainting or loss of consciousness  · Rapid or very slow heart rate  · Loss of bowel or bladder control  When to seek medical advice  Call your healthcare provider right away if any of these occur:  · Pain becomes worse or spreads into your arms or legs  · Weakness, numbness or pain in one or both arms or legs  · Numbness in the groin area  · Difficulty walking  · Fever of 100.4ºF (38ºC) or higher, or as directed by your healthcare provider  Date Last Reviewed: 7/1/2016 © 2000-2016 [employer]. [address] [place] [address], [place] [address]. All rights reserved. This information is not intended as a substitute for professional medical care. Always follow your healthcare professional's instructions.

## 2017-05-19 NOTE — Clinical Note
Patient with J-tube replaced; no other complaints. Eligable for discharge once patient completes his MRI of the L-spine and results are no acute findings requiring emergent intervention.

## 2017-05-19 NOTE — PROGRESS NOTES
He is in the ED  Needs to undergo MRI spine for weakness in legs.  Called ED and spoke to Dr. Lenz

## 2017-05-19 NOTE — TELEPHONE ENCOUNTER
----- Message from Monica Rollins MA sent at 5/19/2017  3:28 PM CDT -----  Please schedule patient for appointment  ----- Message -----     From: Pau Parra NP     Sent: 5/19/2017   3:21 PM       To: Anita [facility] Staff    MARIA LUISAI-    Mr Monsalve has developed sudden increasing weakness/numbness in his legs/feet. He is concerned about possible tumor progression. I told him I would send you a note to book a follow up appointment. He is otherwise looking quite well.     Pau

## 2017-05-19 NOTE — PROGRESS NOTES
Mr Monsalve is a patient known to our service as he has an esophageal adenocarcinoma and had been evaluated for surgery, unfortunately, his cardiac EF was not sufficient to allow us to operate. He came by to see if we could replace the J  Tube as it had fallen out and he couldn't get it back in to the site. At diagnosis, he had an EGD 1/28/2016 which showed him to have a 2.5 cms submucosal mass at the GE junction. This was biopsied and was consistent with an infiltrating poorly differentiated adenocarcinoma. An EUS done subsequently showed a T3 lesion and no significant lymphadenopathy. Received concomitant chemotherapy (weekly carboplatin and paclitaxel) and XRT.      He has been almost entirely J tube dependant, although has maintained his weight and is looking quite well. He does report though in the last 2 weeks, his legs feel weaker and earlier in the week, he fell without warning. He is concerned about progression of disease.     Today, I was unable to replace the J tube in clinic- the insertion site is closed and will not allow entrance of a soft tube, despite several attempts. We directed him to the ED to have IVF and to see if someone can attempt re-insertion or might have to be done under anesthesia.       PLAN:    1. Refer to ED for J tube replacement, IVF as pt cannot intake oral nutrition or hydration to maintain his metabolic requirements.   2. Note to Dr. Howard to advise about increasing weakness, follow up appt.

## 2017-05-19 NOTE — Clinical Note
DANNY-  Mr Monsalve has developed sudden increasing weakness/numbness in his legs/feet. He is concerned about possible tumor progression. I told him I would send you a note to book a follow up appointment. He is otherwise looking quite well.   Pau

## 2017-05-19 NOTE — TELEPHONE ENCOUNTER
----- Message from Trang Watts sent at 5/19/2017 12:48 PM CDT -----  Contact: Zamzam/Wife  Caller states pt came in for apt this morning and she hasn't been able to get in touch with him since.Please call Zamzam back @  [phone].Thank you.

## 2017-05-19 NOTE — TELEPHONE ENCOUNTER
Notified wife that spouse was in ED concerning his Jtube.  Provided my direct number if she has any other questions.

## 2017-05-19 NOTE — ED PROVIDER NOTES
"Encounter Date: 5/19/2017    SCRIBE #1 NOTE: I, Lashawn Mata, am scribing for, and in the presence of,  Dr. Lenz. I have scribed the following portions of the note - the Resident attestation.       History     Chief Complaint   Patient presents with    J Tube Problem     Pt states "My J tube fell out yesterday at noom.      Review of patient's allergies indicates:   Allergen Reactions    Penicillins Hives    Streptomycin Hives    Sulfa (sulfonamide antibiotics) Hives     HPI Comments: Mr. Roni Monsalve is a 78 yo male here with a J-tube that has fallen out and PMH significant for Esophageal cancer s/p chemoradiation (diagnosed Jan 2016), CHF, COPD, CAD, previous MI w/ stents in 2000, abdominal aneurysm repair, HLD, and HTN. He has required a J-tube for inability to pass nutrition through his esophagus due to an esophageal mass. Per patient two mornings ago he noticed his J-tube was no longer in place when he awoke (uses tape to secure it as previous suturing did not stay intact and was frequently pulled out). He attempted to place it back in later that morning in the shower without success. He was busy with home repairs and did not present to clinic until this morning. Attempts with a catheter and lubricant were unsuccessful; he was subsequently sent to the ED for further management. He denies any fevers, chills, abdominal pain, nausea, vomiting, diarrhea, or constipation. In the ED we attempted to evaluate the J-tube tract with a 8 Fr Coude tip but were also unsuccessful. He has not had any nutrition since the J-tube fell out.     The history is provided by the patient and medical records.     Past Medical History:   Diagnosis Date    Abdominal aneurysm 2001    repaired     Cancer     skin - face and arm    Cardiac defibrillator in place     Cardiomyopathy     CHF (congestive heart failure)     COPD (chronic obstructive pulmonary disease)     Coronary artery disease     cardiac stent     " Diverticulitis 2015    Heart attack 2000    Hepatitis B infection 1984    Hyperlipidemia LDL goal <70     Hypertension      Past Surgical History:   Procedure Laterality Date    ABDOMINAL SURGERY      CARDIAC DEFIBRILLATOR PLACEMENT  2015    upgraded to dual chamber Medtronic when device EOL due to sinus asael    CARDIAC DEFIBRILLATOR PLACEMENT  2006    single chamber    CORONARY STENT PLACEMENT      EYE SURGERY      HERNIA REPAIR      bilateral    SKIN BIOPSY      SKIN CANCER EXCISION      THORACOABDOMINAL AORTIC ANEURYSM REPAIR      VASCULAR SURGERY       Family History   Problem Relation Age of Onset    Cancer Mother     Cancer Father     Heart disease Brother      Social History   Substance Use Topics    Smoking status: Former Smoker     Packs/day: 0.50     Types: Cigarettes    Smokeless tobacco: Not on file      Comment: quit 6 months ago     Alcohol use 0.0 oz/week      Comment: occasional     Review of Systems   Constitutional: Positive for fatigue. Negative for chills and fever.   HENT: Negative for sinus pressure, sneezing and sore throat.    Eyes: Negative for visual disturbance.   Respiratory: Negative for cough, chest tightness, shortness of breath and wheezing.    Cardiovascular: Negative for chest pain and palpitations.   Gastrointestinal: Negative for abdominal distention, constipation, diarrhea, nausea and vomiting.   Genitourinary: Negative for dysuria and hematuria.   Musculoskeletal: Negative for back pain, neck pain and neck stiffness.   Skin: Negative for pallor, rash and wound.   Neurological: Positive for weakness (Lower extremity). Negative for seizures, speech difficulty and numbness.   Psychiatric/Behavioral: Negative for agitation and confusion.       Physical Exam   Initial Vitals   BP Pulse Resp Temp SpO2   05/19/17 1038 05/19/17 1038 05/19/17 1038 05/19/17 1038 05/19/17 1038   133/60 115 18 97.9 °F (36.6 °C) 98 %     Physical Exam    Constitutional: He appears  well-developed. He is not diaphoretic. No distress.   HENT:   Head: Normocephalic and atraumatic.   Eyes: EOM are normal. Pupils are equal, round, and reactive to light.   Neck: Normal range of motion. Neck supple. No JVD present.   Cardiovascular: Normal rate and regular rhythm. Exam reveals no gallop and no friction rub.    No murmur heard.  Pulmonary/Chest: Breath sounds normal. No respiratory distress. He has no wheezes. He exhibits no tenderness.   Abdominal: Soft. Bowel sounds are normal. He exhibits no distension. There is no tenderness. There is no rebound and no guarding.   J-tube site with slight erythema but no induration, purulent discharge, or warmth. Small superficial meatus present but could not be navigated with catheter.    Musculoskeletal: Normal range of motion. He exhibits no edema or tenderness.   Neurological: He is alert and oriented to person, place, and time. No cranial nerve deficit.   Skin: Skin is warm and dry. No rash noted.   Psychiatric: He has a normal mood and affect.         ED Course   Procedures  Labs Reviewed   CBC W/ AUTO DIFFERENTIAL - Abnormal; Notable for the following:        Result Value    MCH 31.9 (*)     Platelets 129 (*)     Lymph # 0.8 (*)     Gran% 80.8 (*)     Lymph% 10.1 (*)     All other components within normal limits   BASIC METABOLIC PANEL - Abnormal; Notable for the following:     CO2 22 (*)     BUN, Bld 25 (*)     All other components within normal limits   COMPREHENSIVE METABOLIC PANEL - Abnormal; Notable for the following:     CO2 22 (*)     BUN, Bld 25 (*)     Albumin 3.4 (*)     Total Bilirubin 1.8 (*)     All other components within normal limits    Narrative:     Add on order [id number] CMP. Per Terrell Adler MD  05/19/2017    13:37     COMPREHENSIVE METABOLIC PANEL             Medical Decision Making:   History:   Old Medical Records: I decided to obtain old medical records.  Initial Assessment:   Mr. Roni Monsalve is a 76 yo male here with a J-tube  that has fallen out and PMH significant for Esophageal cancer s/p chemoradiation (diagnosed Jan 2016), CHF, COPD, CAD, previous MI w/ stents in 2000, abdominal aneurysm repair, HLD, and HTN. It has been more than 48 hours since his J-tube fell out and multiple attempts to place it back have been unsuccessful. Patient relies on J-tube access for all nutrition and fluids and appears dehydrated.   Differential Diagnosis:   J-tube obstruction  Dehydration   Electrolyte imbalance  Clinical Tests:   Lab Tests: Ordered and Reviewed  ED Management:  Ordered CBC, CMP, and ordered D5 0.45 NS at 125 mL/hr. Surgical Oncology consulted.   MDS 12:19 PM    CBC shows thrombocytopenia; CMP with bilirubin 1.8.   MDS 2:35 PM    IR to bedside; with wire able to pass catheter into small bowel; confirmed on XR Abdomen. Await CT lumbar spine (ordered for Dr. Howard as patient has been having lower extremity weakness' concern for mets/spinal stenosis)  MDS 5:13 PM        2000: CT l-spine negative for mets; reveals multilevel degenerative changes of l-spine of L3-L4 and L4-L5 with moderate spinal canal stenosis. Will d/c to home with return precautions and f/u with Dr. Howard.   Johnny Jones MD  PGY-1 [employer] EM              Scribe Attestation:   Scribe #1: I performed the above scribed service and the documentation accurately describes the services I performed. I attest to the accuracy of the note.    Attending Attestation:   Physician Attestation Statement for Resident:  As the supervising MD   Physician Attestation Statement: I have personally seen and examined this patient.   I agree with the above history. -: Pt presents from surgery clinic. His J tube fell out 3 days ago and he has been unable to replace it or eat/drink x 2 days.   As the supervising MD I agree with the above PE.   -: Mildly dehydrated. No acute distress. Abdomen soft. J tube site looks good.   As the supervising MD I agree with the above treatment, course, plan, and  disposition.   -: I attempted to place a 8 [place] coude tipped red rubber catheter with no success. Will start IV fluids, check labs, and consult general surgery.    D/W Surg - rec working with IR  With Dr Yang, placed guidewire through tract and placed red rubber 8 fr catheter in the duodenum  Post film with contrast suggests successful placement    Dr Howard was concerned about gradual onset of leg weakness.  He is unable to get MRI with pacemaker  He has motor to legs and no change in bowel or bladder.  I checked out case to Dr Coker with CT of back pending  Recommend to d/w oncology if mets present on CT          Physician Attestation for Scribe:  Physician Attestation Statement for Scribe #1: I, Dr. Lenz, reviewed documentation, as scribed by Lashawn Mata in my presence, and it is both accurate and complete.                 ED Course     Clinical Impression:   The primary encounter diagnosis was Jejunostomy tube fell out. A diagnosis of Back pain was also pertinent to this visit.          Johnny Jones MD  Resident  05/19/17 7064       Sixto Lenz MD  05/22/17 [id number]

## 2017-05-31 NOTE — TELEPHONE ENCOUNTER
Informed patient dr boswell will not fill his xanax, as per previous discussion, as he is not on current tx. Patient voiced understanding.

## 2017-06-06 ENCOUNTER — TELEPHONE (OUTPATIENT)
Dept: HEMATOLOGY/ONCOLOGY | Facility: CLINIC | Age: 77
End: 2017-06-06

## 2017-06-06 NOTE — TELEPHONE ENCOUNTER
----- Message from Marlen Patton sent at 6/6/2017  4:19 PM CDT -----  Patient would like the nurse to give him a call back about his bags for his feeding tube. He can be reached at [phone].

## 2017-06-06 NOTE — TELEPHONE ENCOUNTER
Returned call to patient, who is calling to speak with nurse about feeding tube supplies. Nurse informed patient he would need to contact the surgeon's office for supplies. Patient voiced understanding, as that is who he thought he called.

## 2017-07-03 ENCOUNTER — TELEPHONE (OUTPATIENT)
Dept: SURGERY | Facility: CLINIC | Age: 77
End: 2017-07-03

## 2017-07-03 NOTE — TELEPHONE ENCOUNTER
----- Message from Linette Samano sent at 7/3/2017 12:52 PM CDT -----  Contact: self   Roni States that she need a call returned by a nurse in reference to needing j tube has a hole. Please call Roni @ [phone]  . Thanks :)

## 2017-07-05 ENCOUNTER — OFFICE VISIT (OUTPATIENT)
Dept: SURGERY | Facility: CLINIC | Age: 77
End: 2017-07-05
Payer: MEDICARE

## 2017-07-05 VITALS
DIASTOLIC BLOOD PRESSURE: 68 MMHG | SYSTOLIC BLOOD PRESSURE: 102 MMHG | WEIGHT: 169.31 LBS | BODY MASS INDEX: 22.93 KG/M2 | HEIGHT: 72 IN | HEART RATE: 85 BPM | TEMPERATURE: 99 F

## 2017-07-05 DIAGNOSIS — K94.13 MALFUNCTIONING JEJUNOSTOMY TUBE: Primary | ICD-10-CM

## 2017-07-05 PROCEDURE — 99024 POSTOP FOLLOW-UP VISIT: CPT | Mod: S$GLB,,, | Performed by: NURSE PRACTITIONER

## 2017-07-05 PROCEDURE — 99999 PR PBB SHADOW E&M-EST. PATIENT-LVL III: CPT | Mod: PBBFAC,,, | Performed by: NURSE PRACTITIONER

## 2017-07-06 NOTE — PROGRESS NOTES
Replaced current 8FR red rubber w a 10 FR red rubber as previous one is ripping and too small. Pt tolerated well. Next time replace w 12FR.

## 2017-08-25 ENCOUNTER — HOSPITAL ENCOUNTER (EMERGENCY)
Facility: HOSPITAL | Age: 77
Discharge: HOME | End: 2017-08-25
Attending: EMERGENCY MEDICINE
Payer: MEDICARE

## 2017-08-25 VITALS
WEIGHT: 170 LBS | TEMPERATURE: 98 F | BODY MASS INDEX: 23.03 KG/M2 | SYSTOLIC BLOOD PRESSURE: 93 MMHG | OXYGEN SATURATION: 96 % | DIASTOLIC BLOOD PRESSURE: 61 MMHG | RESPIRATION RATE: 20 BRPM | HEIGHT: 72 IN | HEART RATE: 103 BPM
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
## 2017-08-25 DIAGNOSIS — R53.1 WEAKNESS: ICD-10-CM

## 2017-08-25 LAB
ANION GAP SERPL CALC-SCNC: 11 MMOL/L
BACTERIA #/AREA URNS AUTO: NORMAL /HPF
BASOPHILS # BLD AUTO: 0.01 K/UL
BASOPHILS NFR BLD: 0.1 %
BILIRUB UR QL STRIP: NEGATIVE
BUN SERPL-MCNC: 41 MG/DL
CALCIUM SERPL-MCNC: 8.8 MG/DL
CHLORIDE SERPL-SCNC: 105 MMOL/L
CLARITY UR REFRACT.AUTO: CLEAR
CO2 SERPL-SCNC: 25 MMOL/L
COLOR UR AUTO: YELLOW
CREAT SERPL-MCNC: 0.9 MG/DL
DIFFERENTIAL METHOD: ABNORMAL
EOSINOPHIL # BLD AUTO: 0 K/UL
EOSINOPHIL NFR BLD: 0.1 %
ERYTHROCYTE [DISTWIDTH] IN BLOOD BY AUTOMATED COUNT: 13.8 %
EST. GFR  (AFRICAN AMERICAN): >60 ML/MIN/1.73 M^2
EST. GFR  (NON AFRICAN AMERICAN): >60 ML/MIN/1.73 M^2
GLUCOSE SERPL-MCNC: 142 MG/DL
GLUCOSE UR QL STRIP: ABNORMAL
HCT VFR BLD AUTO: 34.4 %
HGB BLD-MCNC: 11.7 G/DL
HGB UR QL STRIP: NEGATIVE
KETONES UR QL STRIP: NEGATIVE
LEUKOCYTE ESTERASE UR QL STRIP: NEGATIVE
LYMPHOCYTES # BLD AUTO: 1.3 K/UL
LYMPHOCYTES NFR BLD: 9 %
MAGNESIUM SERPL-MCNC: 2 MG/DL
MCH RBC QN AUTO: 31.2 PG
MCHC RBC AUTO-ENTMCNC: 34 G/DL
MCV RBC AUTO: 92 FL
MICROSCOPIC COMMENT: NORMAL
MONOCYTES # BLD AUTO: 0.7 K/UL
MONOCYTES NFR BLD: 4.7 %
NEUTROPHILS # BLD AUTO: 11.9 K/UL
NEUTROPHILS NFR BLD: 85.7 %
NITRITE UR QL STRIP: NEGATIVE
PH UR STRIP: 5 [PH] (ref 5–8)
PHOSPHATE SERPL-MCNC: 2.2 MG/DL
PLATELET # BLD AUTO: 171 K/UL
PMV BLD AUTO: 10 FL
POTASSIUM SERPL-SCNC: 4.5 MMOL/L
PROT UR QL STRIP: NEGATIVE
RBC # BLD AUTO: 3.75 M/UL
RBC #/AREA URNS AUTO: 1 /HPF (ref 0–4)
SODIUM SERPL-SCNC: 141 MMOL/L
SP GR UR STRIP: 1.03 (ref 1–1.03)
SQUAMOUS #/AREA URNS AUTO: 0 /HPF
URN SPEC COLLECT METH UR: ABNORMAL
UROBILINOGEN UR STRIP-ACNC: NEGATIVE EU/DL
WBC # BLD AUTO: 13.83 K/UL
WBC #/AREA URNS AUTO: 1 /HPF (ref 0–5)

## 2017-08-25 PROCEDURE — 25000003 PHARM REV CODE 250

## 2017-08-25 PROCEDURE — 96375 TX/PRO/DX INJ NEW DRUG ADDON: CPT

## 2017-08-25 PROCEDURE — 93005 ELECTROCARDIOGRAM TRACING: CPT

## 2017-08-25 PROCEDURE — 84100 ASSAY OF PHOSPHORUS: CPT

## 2017-08-25 PROCEDURE — 81001 URINALYSIS AUTO W/SCOPE: CPT

## 2017-08-25 PROCEDURE — 83735 ASSAY OF MAGNESIUM: CPT

## 2017-08-25 PROCEDURE — 93010 ELECTROCARDIOGRAM REPORT: CPT | Mod: ,,, | Performed by: INTERNAL MEDICINE

## 2017-08-25 PROCEDURE — 85025 COMPLETE CBC W/AUTO DIFF WBC: CPT

## 2017-08-25 PROCEDURE — 80048 BASIC METABOLIC PNL TOTAL CA: CPT

## 2017-08-25 PROCEDURE — 96361 HYDRATE IV INFUSION ADD-ON: CPT

## 2017-08-25 PROCEDURE — 63600175 PHARM REV CODE 636 W HCPCS

## 2017-08-25 PROCEDURE — 99285 EMERGENCY DEPT VISIT HI MDM: CPT | Mod: 25

## 2017-08-25 PROCEDURE — 96374 THER/PROPH/DIAG INJ IV PUSH: CPT

## 2017-08-25 RX ORDER — ONDANSETRON 2 MG/ML
4 INJECTION INTRAMUSCULAR; INTRAVENOUS
Status: COMPLETED | OUTPATIENT
Start: 2017-08-25 | End: 2017-08-25

## 2017-08-25 RX ORDER — HYDROCODONE BITARTRATE AND ACETAMINOPHEN 5; 325 MG/1; MG/1
1 TABLET ORAL EVERY 6 HOURS PRN
Qty: 15 TABLET | Refills: 0 | Status: SHIPPED | OUTPATIENT
Start: 2017-08-25

## 2017-08-25 RX ORDER — ONDANSETRON 4 MG/1
4 TABLET, ORALLY DISINTEGRATING ORAL
Status: DISCONTINUED | OUTPATIENT
Start: 2017-08-25 | End: 2017-08-25

## 2017-08-25 RX ORDER — SODIUM CHLORIDE 9 MG/ML
1000 INJECTION, SOLUTION INTRAVENOUS
Status: DISCONTINUED | OUTPATIENT
Start: 2017-08-25 | End: 2017-08-25

## 2017-08-25 RX ORDER — ONDANSETRON 4 MG/1
4 TABLET, FILM COATED ORAL EVERY 6 HOURS
Qty: 12 TABLET | Refills: 0 | Status: SHIPPED | OUTPATIENT
Start: 2017-08-25

## 2017-08-25 RX ORDER — OXYCODONE HYDROCHLORIDE 5 MG/1
5 TABLET ORAL
Status: COMPLETED | OUTPATIENT
Start: 2017-08-25 | End: 2017-08-25

## 2017-08-25 RX ORDER — ACETAMINOPHEN 10 MG/ML
1000 INJECTION, SOLUTION INTRAVENOUS ONCE
Status: COMPLETED | OUTPATIENT
Start: 2017-08-25 | End: 2017-08-25

## 2017-08-25 RX ADMIN — ACETAMINOPHEN 1000 MG: 10 INJECTION, SOLUTION INTRAVENOUS at 12:08

## 2017-08-25 RX ADMIN — OXYCODONE HYDROCHLORIDE 5 MG: 5 TABLET ORAL at 05:08

## 2017-08-25 RX ADMIN — SODIUM CHLORIDE 250 ML: 0.9 INJECTION, SOLUTION INTRAVENOUS at 02:08

## 2017-08-25 RX ADMIN — ONDANSETRON 4 MG: 2 INJECTION INTRAMUSCULAR; INTRAVENOUS at 12:08

## 2017-08-25 RX ADMIN — ONDANSETRON 4 MG: 2 INJECTION INTRAMUSCULAR; INTRAVENOUS at 05:08

## 2017-08-25 RX ADMIN — SODIUM CHLORIDE 250 ML: 0.9 INJECTION, SOLUTION INTRAVENOUS at 12:08

## 2017-08-25 NOTE — ED TRIAGE NOTES
Pt states he got up to warm up some water to drink and began nauseated and started gaging.  He was going to the bathroom and began nausea and dizzy and legs weak and fell to ground on to ceramic tile, striking R elbow on  Floor.  Little skin tear noted.  Reports the feeding tube to LLQ was leaking some bloody

## 2017-08-25 NOTE — ED PROVIDER NOTES
Encounter Date: 8/25/2017    SCRIBE #1 NOTE: I, Farheen Ayoub, am scribing for, and in the presence of,  Dr. Stokes. I have scribed the following portions of the note - the EKG reading.       History     Chief Complaint   Patient presents with    Other     bleeding from jtube after fall,  vomiting      78yo M with h/o esophageal CA s/p radiation and J tube placement for all nutrition, CHF (EF 20%), COPD, CAD, AAA repair, HLD, HTN, GERD, PUD who presents with lightheadedness and fall today. The pt states he has been feeling nausea and weakness since yesterday. Today, he became lightheaded while walking and fell onto his knees. He scraped his elbow but denies hitting his head. No LOC. No neurologic deficits or confusion. He take a daily aspirin, no other blood thinners. Rt knee pain>lt knee pain, no elbow pain. Full ROM of all joints. No abrasions to his head. He denies chest pain and SOB.    At the time of the fall, the pt noticed a small amount of blood discharge from his J tube (unsure if it came from the tube site or within the tube). He states all the blood collected on his shirt; about 3cc blood is present, with small dry blood around the stoma. No active bleeding. Site is not erythematous. No purulent drainage. No fevers/chills. No hematochezia or melana stools.          Review of patient's allergies indicates:   Allergen Reactions    Penicillins Hives    Streptomycin Hives    Sulfa (sulfonamide antibiotics) Hives     Past Medical History:   Diagnosis Date    Abdominal aneurysm 2001    repaired     Cancer     skin - face and arm    Cardiac defibrillator in place     Cardiomyopathy     CHF (congestive heart failure)     COPD (chronic obstructive pulmonary disease)     Coronary artery disease     cardiac stent     Diverticulitis 2015    Heart attack 2000    Hepatitis B infection 1984    Hyperlipidemia LDL goal <70     Hypertension      Past Surgical History:   Procedure Laterality Date     ABDOMINAL SURGERY      CARDIAC DEFIBRILLATOR PLACEMENT  2015    upgraded to dual chamber Medtronic when device EOL due to sinus asael    CARDIAC DEFIBRILLATOR PLACEMENT  2006    single chamber    CORONARY STENT PLACEMENT      EYE SURGERY      HERNIA REPAIR      bilateral    SKIN BIOPSY      SKIN CANCER EXCISION      THORACOABDOMINAL AORTIC ANEURYSM REPAIR      VASCULAR SURGERY       Family History   Problem Relation Age of Onset    Cancer Mother     Cancer Father     Heart disease Brother      Social History   Substance Use Topics    Smoking status: Current Every Day Smoker     Packs/day: 0.50     Types: Cigarettes    Smokeless tobacco: Never Used      Comment: quit 6 months ago     Alcohol use No      Comment: occasional     Review of Systems   Constitutional: Negative for chills and fever.   HENT: Positive for trouble swallowing. Negative for facial swelling.    Eyes: Negative for pain and redness.   Respiratory: Negative for chest tightness and shortness of breath.    Cardiovascular: Negative for chest pain and leg swelling.   Gastrointestinal: Positive for nausea. Negative for abdominal distention, abdominal pain and vomiting.        Nausea with wretching, no vomiting   Genitourinary: Negative for difficulty urinating, dysuria and hematuria.   Skin: Positive for wound (Lt elbow). Negative for rash.   Neurological: Positive for weakness and light-headedness. Negative for facial asymmetry, numbness and headaches.   Psychiatric/Behavioral: Negative for agitation and behavioral problems.       Physical Exam     Initial Vitals [08/25/17 0743]   BP Pulse Resp Temp SpO2   93/61 103 20 98.4 °F (36.9 °C) 96 %      MAP       71.67         Physical Exam    Constitutional: He appears well-developed and well-nourished. He is not diaphoretic. No distress.   HENT:   Head: Normocephalic and atraumatic.   No abrasions on head. Dry mucus membranes.   Eyes: Conjunctivae and EOM are normal. Pupils are equal, round,  and reactive to light.   Neck: Normal range of motion. Neck supple.   Cardiovascular: Normal rate, regular rhythm and intact distal pulses.   Distant, quiet heart sounds   Pulmonary/Chest: Breath sounds normal. No respiratory distress. He has no wheezes.   Abdominal: Soft. Bowel sounds are normal. He exhibits no distension. There is no tenderness.   J tube in place on mid abdomen. Small amount of dry blood present around the stoma site. About 3cc blood on the pts shirt. No blood draining from the tube. No tenderness at the site, no erythema, no purulent drainage.   Musculoskeletal: Normal range of motion. He exhibits no edema.   1x1cm abrasion proximal to lt elbow, no tenderness at the olecranon. No tenderness with elbow movement, full ROM. On lt knee, no patellar tenderness, no tenderness with full active ROM or internal/external rotation. On rt knee, tenderness to palpation of patella. Full active ROM in tact, no pain with internal/external rotation. No instability in b/l knees.   Neurological: He is alert and oriented to person, place, and time.   CN II-XII in tact. Strength 5/5 in arms and legs.   Skin: Skin is warm and dry.   Psychiatric: He has a normal mood and affect. His behavior is normal.         ED Course   Procedures  Labs Reviewed   CBC W/ AUTO DIFFERENTIAL - Abnormal; Notable for the following:        Result Value    WBC 13.83 (*)     RBC 3.75 (*)     Hemoglobin 11.7 (*)     Hematocrit 34.4 (*)     MCH 31.2 (*)     Gran # 11.9 (*)     Gran% 85.7 (*)     Lymph% 9.0 (*)     All other components within normal limits   BASIC METABOLIC PANEL - Abnormal; Notable for the following:     Glucose 142 (*)     BUN, Bld 41 (*)     All other components within normal limits   PHOSPHORUS - Abnormal; Notable for the following:     Phosphorus 2.2 (*)     All other components within normal limits    Narrative:     add on per dr Goff order [id number] Phos & order [id number] [facility]   @1359 8/25/17   URINALYSIS, REFLEX TO  URINE CULTURE - Abnormal; Notable for the following:     Glucose, UA 1+ (*)     All other components within normal limits    Narrative:     Preferred Collection Type->Urine, Clean Catch   MAGNESIUM   PHOSPHORUS   MAGNESIUM    Narrative:     add on per dr Goff order [id number] Phos & order [id number] Mag   @1359 8/25/17   URINALYSIS MICROSCOPIC    Narrative:     Preferred Collection Type->Urine, Clean Catch     EKG Readings: (Independently Interpreted)   Heart rate 119, sinus tachycardia, no STEMI          Medical Decision Making:   History:   Old Medical Records: I decided to obtain old medical records.  Initial Assessment:   78yo M with h/o esophageal CA s/p radiation and J tube placement for all nutrition, CHF (EF 20%), COPD, CAD, AAA repair, HLD, HTN, GERD, PUD who presents with lightheadedness and fall today. Pt thinks he may have been taking in less water recently. Urine at bedside is concentrated. He appears volume depleted.    He had some blood from stoma site, however this seems to be a very small amount and is not actively bleeding; no signs of melana/hematochezia.  Differential Diagnosis:   Dehydration  ACS (no chest pain, no SOB, the pt has risk factory in his history but the story does not fit with an ACS picture)  CHF exacerbation (however, pt appears fluid down)  Arrhythmia  Knee fracture vs ligament injury vs soft tissue bruise  Anemia  Gastritis  Lyte abnormality  Independently Interpreted Test(s):   I have ordered and independently interpreted EKG Reading(s) - see prior notes  Clinical Tests:   Lab Tests: Ordered  Radiological Study: Ordered  Medical Tests: Ordered and Reviewed  ED Management:  Xray rt knee due to patellar tenderness. CBC, BMP, Mg, and Phos to check for lyte abnormality/anemia. The pt has EF of 20%, however I feel he is clinically volume depleted, which is likely the cause of his lightheadedness and fall. We will infuse 250mL and reevaluate his volume status.    UPDATE:  No  "fracture on rt knee xr. Pt received 250ml fluids, he appears to still be dehydrated with no signs of fluid overload. No SOB, no edema in LE. Will give another 250mL. Giving 1g IV acetaminophen for pain in his knee. CBC shows white count to 13.8 and Hgb of 11.7, BMP with elevated BUN of 41. UA to evaluate for infection given leukocytosis. CXR to look for pneumonia.    Brett Goff MD  Resident, PGY-1  1:06 PM    UPDATE:  Discussed that we found no acute emergencies that could be responsible for his current problem. It is likely dehydration, and I would like him to follow up with his regular cardiologist and PCP to monitor his fluid intake and adjust as necessary. He agrees with plan. On reexamination, the pt states he feels less lightheaded now but is still thirsty. No longer nauseous. I will give second 250mL Ns. Oxycodone 5 IR for knee pain. If reexamination is nl. After fluids, then we plan to d/c.    Brett Goff MD  Resident, PGY-1  5:01 PM    UPDATE:  As pt was given his oxycodone through the G tube, a small amount ("2 tablespoons") of dark substance came out of his tube when flushed. I was not present to see the material; RN is unsure if blood or digested substance. Pt has h/o PUD, and this is possible that he has one again. From the description of findings I do not feel that he is actively bleeding, and I feel that he is still safe for discharge. I have given him return precautions that if he continues to see blood or starts to have melena/hematochezia he should come in for evaluation. He agrees. Advising him to follow up with GI and giving him a number to call.    Brett Goff MD  Resident, PGY-1  6:24 PM              Scribe Attestation:   Scribe #1: I performed the above scribed service and the documentation accurately describes the services I performed. I attest to the accuracy of the note.    Attending Attestation:           Physician Attestation for Scribe:  Physician Attestation Statement for " Scribe #1: I, Dr. Stokes, reviewed documentation, as scribed by Farheen Ayoub in my presence, and it is both accurate and complete.         Attending ED Notes:   Patient evaluated in the ED because of lightheadedness and an episode of a fall patient has significant past medical history and is undergoing radiation at this time for esophageal CA also has a J-tube in place patient aggressively evaluated in the ED with bloodwork x-rays patient hydrated while in the ED he was felt to be stable and wishes to go home and the patient is discharged she is to follow-up with the oncology team and also the GI team          ED Course      Clinical Impression:   Diagnoses of Fall and Weakness were pertinent to this visit.    Disposition:   Disposition: Discharged  Condition: Stable                        Brett Goff MD  Resident  08/26/17 7250       Chava Stokes MD  09/12/17 3099

## 2017-08-25 NOTE — DISCHARGE INSTRUCTIONS
Please follow up with your primary care doctor and your cardiologist. You may need to change some of the medications that you are taking, or alter the amount of fluids you consume each day. If you experience symptoms such as shortness of breath, chest pain, or worsening weakness, please return to the emergency department for reevaluation.    If you notice black stools or bright red blood in your stool, please return to the emergency department for evaluation. If blood is found to be exiting the tube, please return to the emergency department.

## 2017-08-25 NOTE — ED NOTES
Flushed feeding tube with 20cc saline.  Flushed with air behind it.  Unable to get the 15 cc water with crushed med down tube to regurgitation of fluid.

## 2017-08-25 NOTE — ED NOTES
Pt identifiers checked and correct.    LOC: The patient is awake, alert and aware of environment with an appropriate affect, the patient is oriented x 3 and speaking appropriately.   APPEARANCE: Patient resting comfortably and in no acute distress, patient is clean and well groomed, patient's clothing is properly fastened.   SKIN: The skin is warm and dry, color consistent with ethnicity, patient has normal skin turgor and moist mucus membranes, skin intact, no breakdown or bruising noted.   MUSCULOSKELETAL: Patient moving all extremities spontaneously, no obvious swelling or deformities noted.   RESPIRATORY: Airway is open and patent, respirations are spontaneous, patient has a normal effort and rate, no accessory muscle use noted.   CARDIAC: Patient has a normal rate and regular rhythm, no periphreal edema noted, capillary refill < 3 seconds.   ABDOMEN: Soft and non tender to palpation, no distention noted, active bowel sounds present in all four quadrants. Feeding tube LLQ   NEUROLOGIC: PEd grasp equal and even, purposeful motor response noted, normal sensation in all extremities when touched with a finger.

## 2017-09-11 ENCOUNTER — OFFICE VISIT (OUTPATIENT)
Dept: SURGERY | Facility: CLINIC | Age: 77
End: 2017-09-11
Payer: MEDICARE

## 2017-09-11 VITALS
BODY MASS INDEX: 22.31 KG/M2 | HEIGHT: 72 IN | HEART RATE: 95 BPM | TEMPERATURE: 98 F | DIASTOLIC BLOOD PRESSURE: 66 MMHG | SYSTOLIC BLOOD PRESSURE: 93 MMHG | WEIGHT: 164.69 LBS

## 2017-09-11 DIAGNOSIS — K94.13 MALFUNCTIONING JEJUNOSTOMY TUBE: Primary | ICD-10-CM

## 2017-09-11 PROCEDURE — 99999 PR PBB SHADOW E&M-EST. PATIENT-LVL III: CPT | Mod: PBBFAC,,, | Performed by: SURGERY

## 2017-09-11 PROCEDURE — 99499 UNLISTED E&M SERVICE: CPT | Mod: S$GLB,,, | Performed by: SURGERY

## 2017-09-11 RX ORDER — TRAMADOL HYDROCHLORIDE 50 MG/1
50 TABLET ORAL 2 TIMES DAILY PRN
Refills: 0 | COMMUNITY
Start: 2017-08-14

## 2017-09-11 NOTE — PROGRESS NOTES
Patient had a 10Fr red rubber J tube that fell out this morning. Replaced today with a 12Fr red rubber. He refused to have it stitched down. Reports getting tube feeds without issue.    RTC as ruby Prieto MD  [facility] PGY-1  Pager: [phone]

## 2017-09-23 ENCOUNTER — NURSE TRIAGE (OUTPATIENT)
Dept: ADMINISTRATIVE | Facility: CLINIC | Age: 77
End: 2017-09-23

## 2017-09-23 RX ORDER — NITROGLYCERIN 0.4 MG/1
0.4 TABLET SUBLINGUAL EVERY 5 MIN PRN
Qty: 30 TABLET | Refills: 0 | Status: SHIPPED | OUTPATIENT
Start: 2017-09-23

## 2017-09-23 NOTE — TELEPHONE ENCOUNTER
"    Reason for Disposition   Patient sounds very sick or weak to the triager    Answer Assessment - Initial Assessment Questions  1. LOCATION: "Where does it hurt?"       Pt called re esophagus cancer. Pt having angina coming on a little bit at a time. Pt requesting nitro. Pt sees burke/venecia. +pacer  2. RADIATION: "Does the pain go anywhere else?" (e.g., into neck, jaw, arms, back)     No , OA in spine   3. ONSET: "When did the chest pain begin?" (Minutes, hours or days)      Several days- 9/16 stubborn thought it would go away.   4. PATTERN "Does the pain come and go, or has it been constant since it started?"  "Does it get worse with exertion?"      Steady   5. DURATION: "How long does it last" (e.g., seconds, minutes, hours)     Constant for one week   6. SEVERITY: "How bad is the pain?"  (e.g., Scale 1-10; mild, moderate, or severe)     - MILD (1-3): doesn't interfere with normal activities      - MODERATE (4-7): interferes with normal activities or awakens from sleep     - SEVERE (8-10): excruciating pain, unable to do any normal activities       Mod  6/10 , HX MI   7. CARDIAC RISK FACTORS: "Do you have any history of heart problems or risk factors for heart disease?" (e.g., prior heart attack, angina; high blood pressure, diabetes, being overweight, high cholesterol, smoking, or strong family history of heart disease)     MI 2000, heart function 20-25%, Hx angina  8. PULMONARY RISK FACTORS: "Do you have any history of lung disease?"  (e.g., blood clots in lung, asthma, emphysema, birth control pills)   no   9. CAUSE: "What do you think is causing the chest pain?"      Angina   10. OTHER SYMPTOMS: "Do you have any other symptoms?" (e.g., dizziness, nausea, vomiting, sweating, fever, difficulty breathing, cough)     SOB at rest- baseline, no edema    Protocols used: ST CHEST PAIN-A-AH  having furniture delivered - cant go to ED.   [facility] [phone]. Spoke with dr herrera- try nitro q5 mins " if not gone by third pill go to ED. Nitro 0.4 mg #30  1 q 5 mins prn angina. If more than three go to ED or call MD. Called in at 245pm - spoke with Nettie. Call back with questions.

## 2017-09-25 ENCOUNTER — TELEPHONE (OUTPATIENT)
Dept: ELECTROPHYSIOLOGY | Facility: CLINIC | Age: 77
End: 2017-09-25

## 2017-09-25 DIAGNOSIS — I25.5 ISCHEMIC CARDIOMYOPATHY: ICD-10-CM

## 2017-09-25 RX ORDER — METOPROLOL TARTRATE 50 MG/1
50 TABLET ORAL 2 TIMES DAILY
Qty: 60 TABLET | Refills: 3 | Status: SHIPPED | OUTPATIENT
Start: 2017-09-25 | End: 2018-09-25

## 2017-09-25 NOTE — TELEPHONE ENCOUNTER
Received message from triage nurse advising that patient was weak. Attempted to contact patient but had to leave a message on answering machine. Call back # left.

## 2017-10-12 ENCOUNTER — OFFICE VISIT (OUTPATIENT)
Dept: GASTROENTEROLOGY | Facility: CLINIC | Age: 77
DRG: 194 | End: 2017-10-12
Payer: MEDICARE

## 2017-10-12 ENCOUNTER — HOSPITAL ENCOUNTER (INPATIENT)
Facility: HOSPITAL | Age: 77
LOS: 2 days | Discharge: HOSPICE | DRG: 194 | End: 2017-10-14
Attending: EMERGENCY MEDICINE | Admitting: INTERNAL MEDICINE
Payer: MEDICARE

## 2017-10-12 VITALS
WEIGHT: 165 LBS | HEART RATE: 118 BPM | DIASTOLIC BLOOD PRESSURE: 64 MMHG | HEIGHT: 72 IN | SYSTOLIC BLOOD PRESSURE: 94 MMHG | BODY MASS INDEX: 22.35 KG/M2

## 2017-10-12 DIAGNOSIS — C15.5 MALIGNANT NEOPLASM OF LOWER THIRD OF ESOPHAGUS: ICD-10-CM

## 2017-10-12 DIAGNOSIS — R06.02 SOB (SHORTNESS OF BREATH): ICD-10-CM

## 2017-10-12 DIAGNOSIS — Z51.5 PALLIATIVE CARE ENCOUNTER: ICD-10-CM

## 2017-10-12 DIAGNOSIS — E86.0 DEHYDRATION: Primary | ICD-10-CM

## 2017-10-12 DIAGNOSIS — I50.22 CHRONIC SYSTOLIC CONGESTIVE HEART FAILURE: ICD-10-CM

## 2017-10-12 DIAGNOSIS — Z71.89 GOALS OF CARE, COUNSELING/DISCUSSION: ICD-10-CM

## 2017-10-12 DIAGNOSIS — J18.9 PNEUMONIA OF LOWER LOBE DUE TO INFECTIOUS ORGANISM, UNSPECIFIED LATERALITY: Primary | ICD-10-CM

## 2017-10-12 DIAGNOSIS — R00.0 TACHYCARDIA: ICD-10-CM

## 2017-10-12 LAB
ALBUMIN SERPL BCP-MCNC: 2.5 G/DL
ALP SERPL-CCNC: 148 U/L
ALT SERPL W/O P-5'-P-CCNC: 30 U/L
ANION GAP SERPL CALC-SCNC: 12 MMOL/L
ANISOCYTOSIS BLD QL SMEAR: SLIGHT
AST SERPL-CCNC: 53 U/L
BACTERIA #/AREA URNS AUTO: ABNORMAL /HPF
BASOPHILS # BLD AUTO: 0.02 K/UL
BASOPHILS NFR BLD: 0.1 %
BILIRUB SERPL-MCNC: 1.8 MG/DL
BILIRUB UR QL STRIP: NEGATIVE
BNP SERPL-MCNC: 363 PG/ML
BUN SERPL-MCNC: 36 MG/DL
CALCIUM SERPL-MCNC: 8.5 MG/DL
CHLORIDE SERPL-SCNC: 106 MMOL/L
CK SERPL-CCNC: 47 U/L
CLARITY UR REFRACT.AUTO: ABNORMAL
CO2 SERPL-SCNC: 26 MMOL/L
COLOR UR AUTO: ABNORMAL
CREAT SERPL-MCNC: 0.9 MG/DL
DIFFERENTIAL METHOD: ABNORMAL
EOSINOPHIL # BLD AUTO: 0 K/UL
EOSINOPHIL NFR BLD: 0.1 %
ERYTHROCYTE [DISTWIDTH] IN BLOOD BY AUTOMATED COUNT: 16.2 %
EST. GFR  (AFRICAN AMERICAN): >60 ML/MIN/1.73 M^2
EST. GFR  (NON AFRICAN AMERICAN): >60 ML/MIN/1.73 M^2
GLUCOSE SERPL-MCNC: 167 MG/DL
GLUCOSE UR QL STRIP: ABNORMAL
GRAN CASTS UR QL COMP ASSIST: 1 /LPF
HCT VFR BLD AUTO: 34.6 %
HGB BLD-MCNC: 10.5 G/DL
HGB UR QL STRIP: NEGATIVE
HYALINE CASTS UR QL AUTO: 3 /LPF
KETONES UR QL STRIP: NEGATIVE
LEUKOCYTE ESTERASE UR QL STRIP: NEGATIVE
LYMPHOCYTES # BLD AUTO: 0.6 K/UL
LYMPHOCYTES NFR BLD: 3.7 %
MAGNESIUM SERPL-MCNC: 2 MG/DL
MCH RBC QN AUTO: 28.4 PG
MCHC RBC AUTO-ENTMCNC: 30.3 G/DL
MCV RBC AUTO: 94 FL
MICROSCOPIC COMMENT: ABNORMAL
MONOCYTES # BLD AUTO: 1 K/UL
MONOCYTES NFR BLD: 5.6 %
NEUTROPHILS # BLD AUTO: 15.5 K/UL
NEUTROPHILS NFR BLD: 90.5 %
NITRITE UR QL STRIP: NEGATIVE
OVALOCYTES BLD QL SMEAR: ABNORMAL
PH UR STRIP: 5 [PH] (ref 5–8)
PHOSPHATE SERPL-MCNC: 2.9 MG/DL
PLATELET # BLD AUTO: 57 K/UL
PLATELET BLD QL SMEAR: ABNORMAL
PMV BLD AUTO: ABNORMAL FL
POIKILOCYTOSIS BLD QL SMEAR: SLIGHT
POLYCHROMASIA BLD QL SMEAR: ABNORMAL
POTASSIUM SERPL-SCNC: 3.9 MMOL/L
PROCALCITONIN SERPL IA-MCNC: 0.4 NG/ML
PROT SERPL-MCNC: 6.5 G/DL
PROT UR QL STRIP: ABNORMAL
RBC # BLD AUTO: 3.7 M/UL
RBC #/AREA URNS AUTO: 4 /HPF (ref 0–4)
SODIUM SERPL-SCNC: 144 MMOL/L
SP GR UR STRIP: 1.02 (ref 1–1.03)
URN SPEC COLLECT METH UR: ABNORMAL
UROBILINOGEN UR STRIP-ACNC: 4 EU/DL
WBC # BLD AUTO: 17.35 K/UL
WBC #/AREA URNS AUTO: 3 /HPF (ref 0–5)

## 2017-10-12 PROCEDURE — 99223 1ST HOSP IP/OBS HIGH 75: CPT | Mod: AI,GC,, | Performed by: INTERNAL MEDICINE

## 2017-10-12 PROCEDURE — 20600001 HC STEP DOWN PRIVATE ROOM

## 2017-10-12 PROCEDURE — 25000003 PHARM REV CODE 250: Performed by: STUDENT IN AN ORGANIZED HEALTH CARE EDUCATION/TRAINING PROGRAM

## 2017-10-12 PROCEDURE — 99285 EMERGENCY DEPT VISIT HI MDM: CPT | Mod: 25

## 2017-10-12 PROCEDURE — 83735 ASSAY OF MAGNESIUM: CPT

## 2017-10-12 PROCEDURE — 84145 PROCALCITONIN (PCT): CPT

## 2017-10-12 PROCEDURE — 82550 ASSAY OF CK (CPK): CPT

## 2017-10-12 PROCEDURE — 25000003 PHARM REV CODE 250: Performed by: INTERNAL MEDICINE

## 2017-10-12 PROCEDURE — 96368 THER/DIAG CONCURRENT INF: CPT

## 2017-10-12 PROCEDURE — 81001 URINALYSIS AUTO W/SCOPE: CPT

## 2017-10-12 PROCEDURE — 99999 PR PBB SHADOW E&M-EST. PATIENT-LVL III: CPT | Mod: PBBFAC,,, | Performed by: INTERNAL MEDICINE

## 2017-10-12 PROCEDURE — 83880 ASSAY OF NATRIURETIC PEPTIDE: CPT

## 2017-10-12 PROCEDURE — 63600175 PHARM REV CODE 636 W HCPCS: Performed by: STUDENT IN AN ORGANIZED HEALTH CARE EDUCATION/TRAINING PROGRAM

## 2017-10-12 PROCEDURE — 96366 THER/PROPH/DIAG IV INF ADDON: CPT

## 2017-10-12 PROCEDURE — 99285 EMERGENCY DEPT VISIT HI MDM: CPT | Mod: ,,, | Performed by: EMERGENCY MEDICINE

## 2017-10-12 PROCEDURE — 80053 COMPREHEN METABOLIC PANEL: CPT

## 2017-10-12 PROCEDURE — 93010 ELECTROCARDIOGRAM REPORT: CPT | Mod: ,,, | Performed by: INTERNAL MEDICINE

## 2017-10-12 PROCEDURE — 85025 COMPLETE CBC W/AUTO DIFF WBC: CPT

## 2017-10-12 PROCEDURE — 63600175 PHARM REV CODE 636 W HCPCS: Performed by: PHYSICIAN ASSISTANT

## 2017-10-12 PROCEDURE — 25500020 PHARM REV CODE 255: Performed by: PHYSICIAN ASSISTANT

## 2017-10-12 PROCEDURE — 96365 THER/PROPH/DIAG IV INF INIT: CPT

## 2017-10-12 PROCEDURE — 93005 ELECTROCARDIOGRAM TRACING: CPT

## 2017-10-12 PROCEDURE — 99213 OFFICE O/P EST LOW 20 MIN: CPT | Mod: S$GLB,,, | Performed by: INTERNAL MEDICINE

## 2017-10-12 PROCEDURE — 84100 ASSAY OF PHOSPHORUS: CPT

## 2017-10-12 RX ORDER — ATORVASTATIN CALCIUM 20 MG/1
40 TABLET, FILM COATED ORAL NIGHTLY
Status: DISCONTINUED | OUTPATIENT
Start: 2017-10-12 | End: 2017-10-14 | Stop reason: HOSPADM

## 2017-10-12 RX ORDER — IPRATROPIUM BROMIDE AND ALBUTEROL SULFATE 2.5; .5 MG/3ML; MG/3ML
3 SOLUTION RESPIRATORY (INHALATION) EVERY 6 HOURS PRN
Status: DISCONTINUED | OUTPATIENT
Start: 2017-10-12 | End: 2017-10-13

## 2017-10-12 RX ORDER — AMIODARONE HYDROCHLORIDE 200 MG/1
200 TABLET ORAL DAILY
Status: DISCONTINUED | OUTPATIENT
Start: 2017-10-13 | End: 2017-10-14 | Stop reason: HOSPADM

## 2017-10-12 RX ORDER — CEFTRIAXONE 1 G/1
1 INJECTION, POWDER, FOR SOLUTION INTRAMUSCULAR; INTRAVENOUS
Status: DISCONTINUED | OUTPATIENT
Start: 2017-10-12 | End: 2017-10-12

## 2017-10-12 RX ORDER — METOPROLOL TARTRATE 25 MG/1
50 TABLET, FILM COATED ORAL 2 TIMES DAILY
Status: DISCONTINUED | OUTPATIENT
Start: 2017-10-12 | End: 2017-10-12

## 2017-10-12 RX ORDER — METOPROLOL TARTRATE 25 MG/1
25 TABLET, FILM COATED ORAL 2 TIMES DAILY
Status: DISCONTINUED | OUTPATIENT
Start: 2017-10-12 | End: 2017-10-14 | Stop reason: HOSPADM

## 2017-10-12 RX ORDER — HYDROCODONE BITARTRATE AND ACETAMINOPHEN 5; 325 MG/1; MG/1
1 TABLET ORAL EVERY 6 HOURS PRN
Status: DISCONTINUED | OUTPATIENT
Start: 2017-10-12 | End: 2017-10-14 | Stop reason: HOSPADM

## 2017-10-12 RX ORDER — ENOXAPARIN SODIUM 100 MG/ML
40 INJECTION SUBCUTANEOUS EVERY 24 HOURS
Status: DISCONTINUED | OUTPATIENT
Start: 2017-10-12 | End: 2017-10-14 | Stop reason: HOSPADM

## 2017-10-12 RX ORDER — ONDANSETRON 4 MG/1
4 TABLET, FILM COATED ORAL EVERY 6 HOURS PRN
Status: DISCONTINUED | OUTPATIENT
Start: 2017-10-12 | End: 2017-10-14 | Stop reason: HOSPADM

## 2017-10-12 RX ORDER — ONDANSETRON 4 MG/1
4 TABLET, FILM COATED ORAL EVERY 6 HOURS PRN
Status: DISCONTINUED | OUTPATIENT
Start: 2017-10-12 | End: 2017-10-12

## 2017-10-12 RX ADMIN — IOHEXOL 30 ML: 350 INJECTION, SOLUTION INTRAVENOUS at 01:10

## 2017-10-12 RX ADMIN — CEFTRIAXONE 1 G: 1 INJECTION, SOLUTION INTRAVENOUS at 03:10

## 2017-10-12 RX ADMIN — ATORVASTATIN CALCIUM 40 MG: 20 TABLET, FILM COATED ORAL at 08:10

## 2017-10-12 RX ADMIN — MOXIFLOXACIN HYDROCHLORIDE 400 MG: 400 INJECTION, SOLUTION INTRAVENOUS at 03:10

## 2017-10-12 RX ADMIN — METOPROLOL TARTRATE 25 MG: 25 TABLET, FILM COATED ORAL at 08:10

## 2017-10-12 RX ADMIN — ENOXAPARIN SODIUM 40 MG: 100 INJECTION SUBCUTANEOUS at 08:10

## 2017-10-12 NOTE — ED TRIAGE NOTES
Roni Monsalve ., a 77 y.o. male presents to the ED w/ a c/c of dehydration and onset of weaknessx2 weeks. Hx of esophageal cancer. Pt's family at bedside. Pt's family at bedside and states that he has fallen twice the past two months. Denies LOC/hitting head. Denies numbness/tingling. Endorses nausea/vomiting. Endorses watery stool x2 days. Hx of MI and has pacer. Denies CP Pt has not seen oncologist in a few months. Pt has new onset of tremors and repetitious jaw movement x2 months. Cardiac monitoring initiated. ABCs intact.       Chief Complaint   Patient presents with    Dehydration     from clinic to be admitted for dehydration; esophageal cancer     Review of patient's allergies indicates:   Allergen Reactions    Penicillins Hives    Streptomycin Hives    Sulfa (sulfonamide antibiotics) Hives     Past Medical History:   Diagnosis Date    Abdominal aneurysm 2001    repaired     Cancer     skin - face and arm    Cardiac defibrillator in place     Cardiomyopathy     CHF (congestive heart failure)     COPD (chronic obstructive pulmonary disease)     Coronary artery disease     cardiac stent     Diverticulitis 2015    Heart attack 2000    Hepatitis B infection 1984    Hyperlipidemia LDL goal <70     Hypertension

## 2017-10-12 NOTE — ED NOTES
LOC: The patient is awake, alert and aware of environment with an appropriate affect, the patient is oriented x 3 and speaking appropriately.  APPEARANCE: Patient states he is weak and appears to be in minor distress  SKIN: The skin is warm and dry, patient has normal skin turgor and moist mucus membranes, skin intact, no breakdown or brusing noted.  MUSKULOSKELETAL: Patient moving all extremities well. Pt is tremulous and is exhibiting repetitious jaw movement, no obvious swelling or deformities noted.  RESPIRATORY: Airway is open and patent, respirations are spontaneous, patient has a normal effort and rate. Breath sounds are clear and equal bilaterally.  CARDIAC: Normal heart sounds. No peripheral edema. Pt is tachycardic at 115. EKG done  ABDOMEN: no distention noted. J tube present. Dressing intact. No redness or swelling noted.   NEURO: No neuro deficits, hand grasp equal, no drift noted, no facial droop noted. Speech is clear.

## 2017-10-12 NOTE — ASSESSMENT & PLAN NOTE
No recent hospital admission or IV antibiotics.   Productive cough + SOB + subjective fever  BL rhonchi on the exam   WBC ~77954 with 90% PMN + infiltrates on CXR    Initial impression is CAP.    - will give oxygen  - was started on ceftriaxone 1 gr IV qd + moxifloxacin 400 qd in the ED  - will start breathing treatments with PRN duonebs for SOB + cough  - will f/u on the respiratory cultures

## 2017-10-12 NOTE — SUBJECTIVE & OBJECTIVE
Past Medical History:   Diagnosis Date    Abdominal aneurysm 2001    repaired     Cancer     skin - face and arm    Cardiac defibrillator in place     Cardiomyopathy     CHF (congestive heart failure)     COPD (chronic obstructive pulmonary disease)     Coronary artery disease     cardiac stent     Diverticulitis 2015    Heart attack 2000    Hepatitis B infection 1984    Hyperlipidemia LDL goal <70     Hypertension        Past Surgical History:   Procedure Laterality Date    ABDOMINAL SURGERY      CARDIAC DEFIBRILLATOR PLACEMENT  2015    upgraded to dual chamber Medtronic when device EOL due to sinus asael    CARDIAC DEFIBRILLATOR PLACEMENT  2006    single chamber    CORONARY STENT PLACEMENT      EYE SURGERY      HERNIA REPAIR      bilateral    SKIN BIOPSY      SKIN CANCER EXCISION      THORACOABDOMINAL AORTIC ANEURYSM REPAIR      VASCULAR SURGERY         Review of patient's allergies indicates:   Allergen Reactions    Penicillins Hives    Streptomycin Hives    Sulfa (sulfonamide antibiotics) Hives       No current facility-administered medications on file prior to encounter.      Current Outpatient Prescriptions on File Prior to Encounter   Medication Sig    alprazolam (XANAX) 0.25 MG tablet TAKE 1 TABLET BY MOUTH 3 TIMES A DAY AS NEEDED FOR ANXIETY    amiodarone (PACERONE) 200 MG Tab Take 1 tablet (200 mg total) by mouth once daily.    aspirin (ECOTRIN) 81 MG EC tablet Take 81 mg by mouth once daily.    atorvastatin (LIPITOR) 40 MG tablet Take 1 tablet (40 mg total) by mouth every evening.    DIPHENHYDRAMINE HCL (DUKE'S SOLUTION) Take 10 mLs by mouth 4 (four) times daily.    hydrocodone-acetaminophen 5-325mg (NORCO) 5-325 mg per tablet Take 1 tablet by mouth every 6 (six) hours as needed for Pain.    metoprolol tartrate (LOPRESSOR) 50 MG tablet Take 1 tablet (50 mg total) by mouth 2 (two) times daily.    nitroGLYCERIN (NITROSTAT) 0.4 MG SL tablet Place 1 tablet (0.4 mg total) under  the tongue every 5 (five) minutes as needed for Chest pain (if more than three needed go to ED or call MD.).    nystatin (MYCOSTATIN) cream     omeprazole (PRILOSEC) 40 MG capsule     ondansetron (ZOFRAN) 4 MG tablet TAKE 1 TABLET BY MOUTH EVERY 12 HOURS AS NEEDED FOR NAUSEA    ondansetron (ZOFRAN) 4 MG tablet Take 1 tablet (4 mg total) by mouth every 6 (six) hours.    psyllium (METAMUCIL) packet Take 1 packet by mouth 3 (three) times daily.    tramadol (ULTRAM) 50 mg tablet      Family History     Problem Relation (Age of Onset)    Cancer Mother, Father    Heart disease Brother        Social History Main Topics    Smoking status: Current Every Day Smoker     Packs/day: 0.50     Types: Cigarettes    Smokeless tobacco: Never Used      Comment: quit 6 months ago     Alcohol use No      Comment: occasional    Drug use: No    Sexual activity: Not on file     Review of Systems   Constitutional: Negative for chills, fatigue and fever.   HENT: Positive for trouble swallowing. Negative for congestion, postnasal drip, rhinorrhea, sinus pressure and sore throat.    Eyes: Negative for discharge and redness.   Respiratory: Positive for cough, shortness of breath and wheezing.    Cardiovascular: Negative for chest pain and palpitations.   Gastrointestinal: Positive for abdominal pain. Negative for abdominal distention, blood in stool, constipation, diarrhea, nausea and vomiting.   Genitourinary: Negative for dysuria, frequency and hematuria.        Incontinence recent onset   Musculoskeletal: Negative for arthralgias and joint swelling.   Skin: Negative for color change and rash.   Allergic/Immunologic: Negative for food allergies.   Neurological: Positive for tremors and weakness. Negative for dizziness, numbness and headaches.   Psychiatric/Behavioral: Negative for agitation and confusion. The patient is not nervous/anxious.      Objective:     Vital Signs (Most Recent):  Temp: 98.1 °F (36.7 °C) (10/12/17  1500)  Pulse: 100 (10/12/17 1601)  Resp: 18 (10/12/17 1024)  BP: 108/78 (10/12/17 1601)  SpO2: 98 % (10/12/17 1601) Vital Signs (24h Range):  Temp:  [98 °F (36.7 °C)-98.1 °F (36.7 °C)] 98.1 °F (36.7 °C)  Pulse:  [] 100  Resp:  [18] 18  SpO2:  [94 %-98 %] 98 %  BP: ()/(58-91) 108/78     Weight: 72.6 kg (160 lb)  Body mass index is 21.7 kg/m².    Physical Exam   Constitutional: He is oriented to person, place, and time. He is cooperative. He appears ill. He appears distressed. Nasal cannula in place.   HENT:   Head: Normocephalic and atraumatic.   Mouth/Throat: Mucous membranes are dry.   Eyes: Conjunctivae and EOM are normal. Pupils are equal, round, and reactive to light.   Neck: Normal range of motion. Neck supple.   Cardiovascular: Normal rate and normal heart sounds.    Difficult to assess the heart sounds, very faint   Pulmonary/Chest: No tachypnea. He is in respiratory distress. He has no decreased breath sounds. He has wheezes in the right upper field and the left upper field. He has rhonchi in the right upper field and the left upper field. He has no rales.   The patient has SOB when speaking   Abdominal: Soft. Bowel sounds are normal. He exhibits no distension. There is generalized tenderness (mild). There is no guarding.   Midline laparotomy scar 2/2 AAA repair  J-tube in the left side of abdomen, without discharge, erythema or tenderness at the site   Musculoskeletal: Normal range of motion. He exhibits no edema or tenderness.   Neurological: He is alert and oriented to person, place, and time.   Skin: Skin is warm. No rash noted. He is not diaphoretic. No erythema.   Psychiatric: He has a normal mood and affect. His behavior is normal.   Nursing note and vitals reviewed.       Significant Labs:   BMP:   Recent Labs  Lab 10/12/17  1124   *      K 3.9      CO2 26   BUN 36*   CREATININE 0.9   CALCIUM 8.5*   MG 2.0     CBC:   Recent Labs  Lab 10/12/17  1124   WBC 17.35*   HGB  10.5*   HCT 34.6*   PLT 57*     Cardiac Markers:   Recent Labs  Lab 10/12/17  1124   *     Magnesium:   Recent Labs  Lab 10/12/17  1124   MG 2.0     Respiratory Culture: No results for input(s): GSRESP, RESPIRATORYC in the last 48 hours.  Urine Studies:   Recent Labs  Lab 10/12/17  1355   COLORU Aretha   APPEARANCEUA Hazy*   PHUR 5.0   SPECGRAV 1.025   PROTEINUA 2+*   GLUCUA 1+*   KETONESU Negative   BILIRUBINUA Negative   OCCULTUA Negative   NITRITE Negative   UROBILINOGEN 4.0   LEUKOCYTESUR Negative   RBCUA 4   WBCUA 3   BACTERIA None   HYALINECASTS 3*       Significant Imaging: CXR: I have reviewed all pertinent results/findings within the past 24 hours and my personal findings are:  BL pleural effusionand findings suggestive of pulmonary edema + left basilar infiltrate vs atelectasis  x-ray of J-tube was unremarkable

## 2017-10-12 NOTE — ED NOTES
Rounding  There is no change in patient status at this time and is resting comfortably in bed with side rails up, call bell in reach, and the bed locked and in its lowest position.  Family member at bedside.

## 2017-10-12 NOTE — CONSULTS
[facility]  Palliative Medicine  Consult Note    Patient Name: Roni Monsalve Jr.  MRN: [id number]  Admission Date: 10/12/2017  Hospital Length of Stay: 0 days  Code Status: Full Code   Attending Provider: Tomasa Mclain MD  Consulting Provider: GRACE Levy  Primary Care Physician: YADIEL Reyna MD  Principal Problem:<principal problem not specified>        Inpatient consult to Palliative Care  Consult performed by: MORENA MURPHY  Consult ordered by: ANDIE COLVIN  Assessment/Recommendations: Palliative Care Acknowledgement of Consult - .date 10/12/17 4 PM    Consult received.   Will touch base with team prior to seeing patient.  Full consult to follow.    Thank you for allowing us to be a part of the care of this patient.

## 2017-10-12 NOTE — PROGRESS NOTES
[facility] Consultation Note    Reason for Consult:  The primary encounter diagnosis was Dehydration. A diagnosis of Malignant neoplasm of lower third of esophagus was also pertinent to this visit.    PCP:   YADIEL Taylor MD:  Maggi Self  No address on file    HPI:  This is a 77 y.o. male here. He has had multiple ER visits for dehydration.  He sees Dr. Howard and has been lost to followup but he does not desire any further aggressive treatments and he is not an operative candidate for his esophageal cancer.    During a recent ER visit he had a fall and had some blood around his J tube site, but has not seen any further bleeding. He can keep some liquid and pills by mouth but that is about it. His J tube keeps falling out.      ROS:  Constitutional: No fevers, chills, +weight loss + weakness  ENT: No allergies  CV: No chest pain  Pulm: No cough, No shortness of breath  Ophtho: No vision changes  GI: see HPI  Derm: No rash  Heme: No lymphadenopathy, No bruising  MSK: No arthritis  : No dysuria, No hematuria  Endo: No hot or cold intolerance  Neuro: No syncope, No seizure  Psych: No anxiety, No depression    Medical History:  has a past medical history of Abdominal aneurysm (2001); Cancer; Cardiac defibrillator in place; Cardiomyopathy; CHF (congestive heart failure); COPD (chronic obstructive pulmonary disease); Coronary artery disease; Diverticulitis (2015); Heart attack (2000); Hepatitis B infection (1984); Hyperlipidemia LDL goal <70; and Hypertension.    Surgical History:  has a past surgical history that includes Skin cancer excision; Hernia repair; Thoracoabdominal aortic aneurysm repair; Coronary stent placement; Eye surgery; Skin biopsy; Abdominal surgery; Vascular surgery; Cardiac defibrillator placement (2015); and Cardiac defibrillator placement (2006).    Family History: family history includes Cancer in his father and mother; Heart disease in his brother..      Social History:  reports that he has been smoking Cigarettes.  He has been smoking about 0.50 packs per day. He has never used smokeless tobacco. He reports that he does not drink alcohol or use drugs.    Review of patient's allergies indicates:   Allergen Reactions    Penicillins Hives    Streptomycin Hives    Sulfa (sulfonamide antibiotics) Hives       Current Outpatient Prescriptions   Medication Sig Dispense Refill    alprazolam (XANAX) 0.25 MG tablet TAKE 1 TABLET BY MOUTH 3 TIMES A DAY AS NEEDED FOR ANXIETY 90 tablet 0    amiodarone (PACERONE) 200 MG Tab Take 1 tablet (200 mg total) by mouth once daily. 90 tablet 3    metoprolol tartrate (LOPRESSOR) 50 MG tablet Take 1 tablet (50 mg total) by mouth 2 (two) times daily. 60 tablet 3    nitroGLYCERIN (NITROSTAT) 0.4 MG SL tablet Place 1 tablet (0.4 mg total) under the tongue every 5 (five) minutes as needed for Chest pain (if more than three needed go to ED or call MD.). 30 tablet 0    omeprazole (PRILOSEC) 40 MG capsule   0    psyllium (METAMUCIL) packet Take 1 packet by mouth 3 (three) times daily.  12    aspirin (ECOTRIN) 81 MG EC tablet Take 81 mg by mouth once daily.      atorvastatin (LIPITOR) 40 MG tablet Take 1 tablet (40 mg total) by mouth every evening. 30 tablet 3    DIPHENHYDRAMINE HCL (DUKE'S SOLUTION) Take 10 mLs by mouth 4 (four) times daily. 480 mL 0    hydrocodone-acetaminophen 5-325mg (NORCO) 5-325 mg per tablet Take 1 tablet by mouth every 6 (six) hours as needed for Pain. 15 tablet 0    nystatin (MYCOSTATIN) cream       ondansetron (ZOFRAN) 4 MG tablet TAKE 1 TABLET BY MOUTH EVERY 12 HOURS AS NEEDED FOR NAUSEA 30 tablet 1    ondansetron (ZOFRAN) 4 MG tablet Take 1 tablet (4 mg total) by mouth every 6 (six) hours. 12 tablet 0    tramadol (ULTRAM) 50 mg tablet   0     No current facility-administered medications for this visit.            Objective Findings:    Vital Signs:  BP 94/64   Pulse (!) 118   Ht 6' (1.829 m)   Wt  74.8 kg (165 lb)   BMI 22.38 kg/m²   Body mass index is 22.38 kg/m².    Physical Exam:  General Appearance: seated in wheelchair, ill appearing and weak  Head:   Normocephalic, without obvious abnormality  Eyes:    No scleral icterus, EOMI  ENT: Neck supple, Lips, mucosa, and tongue normal; teeth and gums normal  Lungs: CTA bilaterally in anterior and posterior fields, no wheezes, no crackles.  Heart:  Regular rate and rhythm, S1, S2 normal, no murmurs heard  Abdomen: Soft, non tender, non distended with positive bowel sounds in all four quadrants. No hepatosplenomegaly, ascites, or mass  Extremities: 2+ pulses, no clubbing, cyanosis or edema  Skin: No rash  Neurologic: CN II-XII intact      Labs:  Lab Results   Component Value Date    WBC 13.83 (H) 08/25/2017    HGB 11.7 (L) 08/25/2017    HCT 34.4 (L) 08/25/2017     08/25/2017    CHOL 146 10/07/2014    TRIG 71 10/07/2014    HDL 39 (L) 10/07/2014    ALT 25 05/19/2017    AST 40 05/19/2017     08/25/2017    K 4.5 08/25/2017     08/25/2017    CREATININE 0.9 08/25/2017    BUN 41 (H) 08/25/2017    CO2 25 08/25/2017    INR 1.4 (H) 05/16/2016         Imaging:    Endoscopy:    2016 EGD see EPIC    Assessment:  1. Dehydration    2. Malignant neoplasm of lower third of esophagus         I have called to discuss this case with Dr. Howard but she is busy. The wife states that they need to be admitted to the hospital and she is unable to care for him at home like this. I would consider direct admission but he is tachycardic to 120 in the office.    Recommendations:  1. To ER for likely admission to Onc service. He needs palliative care discussion and family is open to that.  2. Needs J tube check under fluoro to make sure J tube is in place and ask surgery if it can be sutured in since it keeps falling out.  3. Likely needs IV fluids as well    No Follow-up on file.      Order summary:         Thank you so much for allowing me to participate in the care of  Roni Kaba MD

## 2017-10-12 NOTE — HPI
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    The providers of the history are the patient himself and his wife. Patient c/o persistent cough that is worse overnight for the past couple months, he has home oxygen that he rarely uses, and O2 sat without oxygen is at 93-93% at home. His wife mentions that patient has been filling up 1/2-1/3 of a vomiting bag daily with sputum. Also they state that the patient has become weaker since August, having difficulty ambulating even with a walker, and has had 3 falls since then. Before then he was able to ambulate independently, drive and manage his ADLs. The patient was diagnosed with cancer last year and has received chemotherapy + radiation with shrinkage of the tumor to some extent, however due to some stricture/obstruction he hasn't been able to have oral intake (even liquids) and therefore has a J-tube in, that per the patient and wife has come out several times and they inserted it back in themselves. The patient has not been interested in additional course of chemotherapy.  The patient has been smoking ~1PPD x 60 yr, recently decreased to a few cigarettes daily, also he is a former daily alcohol drinker which has stopped since ~15 years ago.

## 2017-10-12 NOTE — ED PROVIDER NOTES
Encounter Date: 10/12/2017    SCRIBE #1 NOTE: IRichelle, am scribing for, and in the presence of, Dr. Mclain. the APC attestation.   SCRIBE #2 NOTE: I, Lily Judge, am scribing for, and in the presence of,  Dr. Mclain. I have scribed the following portions of the note - the EKG reading.     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  Patient states that he went to the GI clinic this morning for persistent cough that has been occurring overnight for the past couple months.  Patient's wife is at bedside states that patient has been coughing a great deal and filling up emesis bags overnight.  Patient also reports that he has been slowly becoming more fatigued and weak since August.  Patient did undergo chemotherapy last spring and was told his tumor did shrink.  Patient was overall doing well however in August had a fall and since then has continued to decline.  Patient has also been having trouble with his J-tube which has fallen out multiple times.  Patient denies any fevers, chills, weight loss, chest pain, or any other complaints.          Review of patient's allergies indicates:   Allergen Reactions    Penicillins Hives    Streptomycin Hives    Sulfa (sulfonamide antibiotics) Hives     Past Medical History:   Diagnosis Date    Abdominal aneurysm 2001    repaired     Cancer     skin - face and arm    Cardiac defibrillator in place     Cardiomyopathy     CHF (congestive heart failure)     COPD (chronic obstructive pulmonary disease)     Coronary artery disease     cardiac  stent     Diverticulitis 2015    Heart attack 2000    Hepatitis B infection 1984    Hyperlipidemia LDL goal <70     Hypertension      Past Surgical History:   Procedure Laterality Date    ABDOMINAL SURGERY      CARDIAC DEFIBRILLATOR PLACEMENT  2015    upgraded to dual chamber Medtronic when device EOL due to sinus asael    CARDIAC DEFIBRILLATOR PLACEMENT  2006    single chamber    CORONARY STENT PLACEMENT      EYE SURGERY      HERNIA REPAIR      bilateral    SKIN BIOPSY      SKIN CANCER EXCISION      THORACOABDOMINAL AORTIC ANEURYSM REPAIR      VASCULAR SURGERY       Family History   Problem Relation Age of Onset    Cancer Mother     Cancer Father     Heart disease Brother      Social History   Substance Use Topics    Smoking status: Current Every Day Smoker     Packs/day: 0.50     Types: Cigarettes    Smokeless tobacco: Never Used      Comment: quit 6 months ago     Alcohol use No      Comment: occasional     Review of Systems   Constitutional: Negative for activity change, appetite change, diaphoresis, fatigue and fever.   HENT: Negative for congestion, dental problem, drooling, ear pain, facial swelling, sore throat and trouble swallowing.    Eyes: Negative for pain, discharge and visual disturbance.   Respiratory: Negative for apnea, cough, chest tightness and shortness of breath.    Cardiovascular: Negative for chest pain and palpitations.   Gastrointestinal: Negative for abdominal distention, anal bleeding, blood in stool, diarrhea, nausea and vomiting.   Endocrine: Negative for cold intolerance and polydipsia.   Genitourinary: Negative for decreased urine volume, difficulty urinating, enuresis, frequency and hematuria.   Musculoskeletal: Negative for arthralgias, gait problem, myalgias and neck stiffness.   Skin: Negative for color change and pallor.   Allergic/Immunologic: Negative for environmental allergies.   Neurological: Negative for dizziness, syncope, numbness and headaches.    Psychiatric/Behavioral: Negative for agitation, confusion and dysphoric mood.       Physical Exam     Initial Vitals [10/12/17 0934]   BP Pulse Resp Temp SpO2   (!) 105/59 (!) 115 18 98 °F (36.7 °C) 97 %      MAP       74.33         Physical Exam    Nursing note and vitals reviewed.  Constitutional: He appears well-developed and well-nourished. He is not diaphoretic. No distress.   HENT:   Head: Normocephalic and atraumatic.   Neck: Normal range of motion. Neck supple.   Cardiovascular: Normal rate, regular rhythm and normal heart sounds. Exam reveals no gallop and no friction rub.    No murmur heard.  Pulmonary/Chest: Breath sounds normal. He has no wheezes. He has no rhonchi. He has no rales.   Course breath sounds and anterior fields.   Abdominal: Soft. Bowel sounds are normal. There is no tenderness. There is no rebound and no guarding.   Musculoskeletal: Normal range of motion.   Neurological: He is alert and oriented to person, place, and time.   Skin: Skin is warm and dry. No rash noted. No erythema.   Psychiatric: He has a normal mood and affect.         ED Course   Procedures  Labs Reviewed   CBC W/ AUTO DIFFERENTIAL - Abnormal; Notable for the following:        Result Value    WBC 17.35 (*)     RBC 3.70 (*)     Hemoglobin 10.5 (*)     Hematocrit 34.6 (*)     MCHC 30.3 (*)     RDW 16.2 (*)     Platelets 57 (*)     Gran # 15.5 (*)     Lymph # 0.6 (*)     Gran% 90.5 (*)     Lymph% 3.7 (*)     Platelet Estimate Decreased (*)     All other components within normal limits   COMPREHENSIVE METABOLIC PANEL - Abnormal; Notable for the following:     Glucose 167 (*)     BUN, Bld 36 (*)     Calcium 8.5 (*)     Albumin 2.5 (*)     Total Bilirubin 1.8 (*)     Alkaline Phosphatase 148 (*)     AST 53 (*)     All other components within normal limits   URINALYSIS, REFLEX TO URINE CULTURE - Abnormal; Notable for the following:     Appearance, UA Hazy (*)     Protein, UA 2+ (*)     Glucose, UA 1+ (*)     All other  components within normal limits   B-TYPE NATRIURETIC PEPTIDE - Abnormal; Notable for the following:      (*)     All other components within normal limits   PROCALCITONIN - Abnormal; Notable for the following:     Procalcitonin 0.40 (*)     All other components within normal limits    Narrative:     ADD ON PER MECHELLE MEYER, ORDER #[id number]   12:55  10/12/2017    URINALYSIS MICROSCOPIC - Abnormal; Notable for the following:     Hyaline Casts, UA 3 (*)     Granular Casts, UA 1 (*)     All other components within normal limits   CULTURE, RESPIRATORY   PHOSPHORUS   MAGNESIUM   PROCALCITONIN   CK   CK    Narrative:     ADD ON PER MECHELLE MEYER, ORDER #[id number]   12:55  10/12/2017   ADD ON CPK PER ROSALIE PEREIRA PA-C AT  10/12/2017  14:10              Medical Decision Making:   History:   Old Medical Records: I decided to obtain old medical records.  Independently Interpreted Test(s):   I have ordered and independently interpreted EKG Reading(s) - see summary below       <> Summary of EKG Reading(s): EKG performed at 1015  No STEMI  Sinus tachycardia with HR of 111  Nl conduction, nl intervals  Non-specific ST changes  No ectopy  Nl axis  Similar to compared EKG dated August 25, 2017  Low QRS    Clinical Tests:   Lab Tests: Ordered and Reviewed  Radiological Study: Ordered and Reviewed  Medical Tests: Ordered and Reviewed    Additional MDM:   Pneumonia: Status: The patient's pulse oximetry was normal (%). Blood Cultures: 2 blood cultures were done before antibiotics.  The patient has a community acquired pneumonia and is being treated with Ceftriaxone 1 g IVPB and Avelox 400 mg IVPB. Consultants: Internal Medicine. Condition: The patient's condition was felt to be guarded.     APC / Resident Notes:   Patient is a 77-year-old male with past medical history of coronary artery disease, hypertension, hyperlipidemia, congestive heart failure with EF of 20%, COPD, and malignant neoplasm of the lower third  esophagus who presents to the emergency department from the GI clinic due to worsening fatigue, shortness of breath, and cough.  Physical exam reveals male in no acute distress.  Heart regular rate and rhythm.  Lungs clear to auscultation bilaterally.  Abdomen soft nontender nondistended.  Will obtain CBC, CMP, alkaline phosphatase, magnesium, BNP.  Will obtain chest x-ray and KUB.      UA shows 2+ protein and 1+ sugar.  CBC shows leukocytosis with a white blood cell count of 17.35, hemoglobin 10.5, hematocrit 34.6, platelet 57.  CMP shows glucose 167, BUN 36, creatinine 0.9, bilirubin 1.8, alkaline phosphatase 148, and AST 53.  Chest x-ray showed cardiac device with findings suggesting pulmonary edema/CHF pattern and small bilateral pleural effusions with probable left basilar atelectasis/infiltrate.  Patient will be given IV moxifloxacin and ceftriaxone for presumed pneumonia.  Patient will be admitted to hospital medicine for further workup.       Scribe Attestation:   Scribe #1: I performed the above scribed service and the documentation accurately describes the services I performed. I attest to the accuracy of the note.  Scribe #2: I performed the above scribed service and the documentation accurately describes the services I performed. I attest to the accuracy of the note.    Attending Attestation:   Physician Attestation Statement for Resident:  As the supervising MD   Physician Attestation Statement: I have personally seen and examined this patient.   I agree with the above history. -:   As the supervising MD I agree with the above PE.   -: PHYSICAL EXAM:  Vital signs and nursing assessment noted:    GEN:   NAD, A & Ox3, atraumatic, well appearing, nontoxic appearing  HEENT:  PERRLA, EOMI, dry mucous membranes, nl conjunctiva, no scleral icterus, no nystagmus, no nodes/nodules, soft, supple, FROM, no trachial deviation, nexus negative  CV:   Tachycardia, no m/r/g, 2+ radial pulses, <2sec cap refill  RESP:   CTA B, no w/r/r, equal and bilateral chest rise, no respiratory distress  ABD:   soft, Nontender, Nondistended, +BS, no guarding/rebound  BACK:  FROM, no midline tenderness, no paraspinal tenderness  :   Deferred  EXT:   FROM, VELA x 4, no edema, no calf tenderness, no swelling  LYMPH:  no gross adenopathy  NEURO:  CN II-XII grossly intact, no obvious motor/sensory deficit, no tremor ,negative Romberg,  nl gait/coordination  PSYCH:   no SI/HI, no anxiety, nl mood/affect, nl judgement/thought process  SKIN:  Poor skin turgor. Ecchymosis on body with different stages of age.      As the supervising MD I agree with the above treatment, course, plan, and disposition.    Physician Attestation Statement for NP/PA:   I have conducted a face to face encounter with this patient in addition to the NP/PA, due to Medical Complexity        Physician Attestation for Scribe:      Comments: I, Dr. Tomasa Mclain, personally performed the services described in this documentation. All medical record entries made by the scribe were at my direction and in my presence.  I have reviewed the chart and agree that the record reflects my personal performance and is accurate and complete. Tomasa Mclain MD.  10:14 AM 10/19/2017      Attending ED Notes:   12:32 - Pt has returned from CXR and reports feeling thirsty.             ED Course      Clinical Impression:   The primary encounter diagnosis was Pneumonia of lower lobe due to infectious organism, unspecified laterality. Diagnoses of Tachycardia and SOB (shortness of breath) were also pertinent to this visit.    Disposition:   Disposition: Admitted  Condition: Stable                        Rosario Dyson PA-C  10/12/17 2906       Tomasa Mclain MD  10/19/17 2854

## 2017-10-13 PROBLEM — Z51.5 PALLIATIVE CARE ENCOUNTER: Status: ACTIVE | Noted: 2017-10-13

## 2017-10-13 LAB
ALBUMIN SERPL BCP-MCNC: 2 G/DL
ALP SERPL-CCNC: 124 U/L
ALT SERPL W/O P-5'-P-CCNC: 28 U/L
ANION GAP SERPL CALC-SCNC: 11 MMOL/L
AST SERPL-CCNC: 90 U/L
BASOPHILS # BLD AUTO: 0.01 K/UL
BASOPHILS NFR BLD: 0.1 %
BILIRUB SERPL-MCNC: 1.4 MG/DL
BUN SERPL-MCNC: 32 MG/DL
CALCIUM SERPL-MCNC: 8.2 MG/DL
CHLORIDE SERPL-SCNC: 109 MMOL/L
CO2 SERPL-SCNC: 25 MMOL/L
CREAT SERPL-MCNC: 0.8 MG/DL
DIFFERENTIAL METHOD: ABNORMAL
EOSINOPHIL # BLD AUTO: 0 K/UL
EOSINOPHIL NFR BLD: 0.2 %
ERYTHROCYTE [DISTWIDTH] IN BLOOD BY AUTOMATED COUNT: 16.3 %
EST. GFR  (AFRICAN AMERICAN): >60 ML/MIN/1.73 M^2
EST. GFR  (NON AFRICAN AMERICAN): >60 ML/MIN/1.73 M^2
GLUCOSE SERPL-MCNC: 98 MG/DL
HCT VFR BLD AUTO: 29.5 %
HGB BLD-MCNC: 8.9 G/DL
LYMPHOCYTES # BLD AUTO: 0.8 K/UL
LYMPHOCYTES NFR BLD: 5.9 %
MAGNESIUM SERPL-MCNC: 1.8 MG/DL
MCH RBC QN AUTO: 28.3 PG
MCHC RBC AUTO-ENTMCNC: 30.2 G/DL
MCV RBC AUTO: 94 FL
MONOCYTES # BLD AUTO: 0.8 K/UL
MONOCYTES NFR BLD: 5.9 %
NEUTROPHILS # BLD AUTO: 11.8 K/UL
NEUTROPHILS NFR BLD: 87.9 %
PHOSPHATE SERPL-MCNC: 3.8 MG/DL
PLATELET # BLD AUTO: 61 K/UL
PMV BLD AUTO: ABNORMAL FL
POTASSIUM SERPL-SCNC: 4.7 MMOL/L
PROT SERPL-MCNC: 5.4 G/DL
RBC # BLD AUTO: 3.15 M/UL
SODIUM SERPL-SCNC: 145 MMOL/L
WBC # BLD AUTO: 13.62 K/UL

## 2017-10-13 PROCEDURE — 83735 ASSAY OF MAGNESIUM: CPT

## 2017-10-13 PROCEDURE — 94640 AIRWAY INHALATION TREATMENT: CPT

## 2017-10-13 PROCEDURE — 94760 N-INVAS EAR/PLS OXIMETRY 1: CPT

## 2017-10-13 PROCEDURE — 27000221 HC OXYGEN, UP TO 24 HOURS

## 2017-10-13 PROCEDURE — 63600175 PHARM REV CODE 636 W HCPCS: Performed by: STUDENT IN AN ORGANIZED HEALTH CARE EDUCATION/TRAINING PROGRAM

## 2017-10-13 PROCEDURE — 85025 COMPLETE CBC W/AUTO DIFF WBC: CPT

## 2017-10-13 PROCEDURE — 84100 ASSAY OF PHOSPHORUS: CPT

## 2017-10-13 PROCEDURE — 36415 COLL VENOUS BLD VENIPUNCTURE: CPT

## 2017-10-13 PROCEDURE — 20600001 HC STEP DOWN PRIVATE ROOM

## 2017-10-13 PROCEDURE — 80053 COMPREHEN METABOLIC PANEL: CPT

## 2017-10-13 PROCEDURE — 63600175 PHARM REV CODE 636 W HCPCS: Performed by: PHYSICIAN ASSISTANT

## 2017-10-13 PROCEDURE — 25000242 PHARM REV CODE 250 ALT 637 W/ HCPCS: Performed by: INTERNAL MEDICINE

## 2017-10-13 PROCEDURE — 99223 1ST HOSP IP/OBS HIGH 75: CPT | Mod: ,,, | Performed by: CLINICAL NURSE SPECIALIST

## 2017-10-13 PROCEDURE — 25000003 PHARM REV CODE 250: Performed by: STUDENT IN AN ORGANIZED HEALTH CARE EDUCATION/TRAINING PROGRAM

## 2017-10-13 PROCEDURE — 99232 SBSQ HOSP IP/OBS MODERATE 35: CPT | Mod: GC,,, | Performed by: INTERNAL MEDICINE

## 2017-10-13 RX ORDER — MOXIFLOXACIN HYDROCHLORIDE 400 MG/1
400 TABLET ORAL DAILY
Qty: 7 TABLET | Refills: 0 | Status: SHIPPED | OUTPATIENT
Start: 2017-10-13 | End: 2017-10-14

## 2017-10-13 RX ORDER — IPRATROPIUM BROMIDE AND ALBUTEROL SULFATE 2.5; .5 MG/3ML; MG/3ML
3 SOLUTION RESPIRATORY (INHALATION)
Status: DISCONTINUED | OUTPATIENT
Start: 2017-10-13 | End: 2017-10-14 | Stop reason: HOSPADM

## 2017-10-13 RX ADMIN — IPRATROPIUM BROMIDE AND ALBUTEROL SULFATE 3 ML: .5; 3 SOLUTION RESPIRATORY (INHALATION) at 07:10

## 2017-10-13 RX ADMIN — ENOXAPARIN SODIUM 40 MG: 100 INJECTION SUBCUTANEOUS at 05:10

## 2017-10-13 RX ADMIN — IPRATROPIUM BROMIDE AND ALBUTEROL SULFATE 3 ML: .5; 3 SOLUTION RESPIRATORY (INHALATION) at 12:10

## 2017-10-13 RX ADMIN — MOXIFLOXACIN HYDROCHLORIDE 400 MG: 400 INJECTION, SOLUTION INTRAVENOUS at 03:10

## 2017-10-13 RX ADMIN — ATORVASTATIN CALCIUM 40 MG: 20 TABLET, FILM COATED ORAL at 08:10

## 2017-10-13 RX ADMIN — AMIODARONE HYDROCHLORIDE 200 MG: 200 TABLET ORAL at 11:10

## 2017-10-13 NOTE — PLAN OF CARE
"Problem: Spiritual Distress, Risk/Actual (Adult,Obstetrics,Pediatric)  Intervention: Facilitate Personal Exploration/Expression of Spirituality  Provided initial visit. Pt and wife present. Introduced and offered pastoral support with reflective listening and grief care. Pt's wife expressed a good process in managing their anticipatory grief. "We love each other and keep the communication open between us," she said. "We've had good talks," she added. Palliative Care has provided grief group resources. Pt's wife also lost her first . Pt and family aware of 's presence as needed.         "

## 2017-10-13 NOTE — PLAN OF CARE
Problem: Patient Care Overview  Goal: Plan of Care Review  Outcome: Ongoing (interventions implemented as appropriate)  Pt remained free from falls. Pt Metoprolol was held this morning d/t low b/p. Nutren 1.5 @50 was ordered. Should go home tomorrow w/ p.o. meds crush and put in j-tube.

## 2017-10-13 NOTE — HPI
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     Per chart review, patient c/o persistent cough that is worse overnight for the past couple months Pt  has home oxygen that he rarely uses, and O2 sat without oxygen is at 93-93% at home. Per  Wife, shementions that patient has been filling up 1/2-1/3 of a vomiting bag daily with sputum. Also they state that the patient has become weaker since August, having difficulty ambulating even with a walker, and has had 3 falls since then. Before then he was able to ambulate independently, drive and manage his ADLs. The patient was diagnosed with cancer last year and has received chemotherapy + radiation with shrinkage of the tumor to some extent, however due to some stricture/obstruction he hasn't been able to have oral intake (even liquids) and therefore has a J-tube in, that per the patient and wife has come out several times and they inserted it back in themselves. The patient has not been interested in additional course of chemotherapy.  The patient has been smoking ~1PPD x 60 yr, recently decreased to a few cigarettes daily, also he is a former daily alcohol drinker which has stopped since ~15 years ago.

## 2017-10-13 NOTE — PLAN OF CARE
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       CHF, chronic [I50.9]  Yes    Hyperlipidemia LDL goal <70 [E78.5]  Yes      Resolved Hospital Problems    Diagnosis Date Resolved POA   No resolved problems to display.       Hospice Qualifying Diagnoses: esophageal cancer       Patient has a life expectancy < 6 months due to these conditions.    Vital Signs: Routine per Hospice Protocol.    Allergies:  Review of patient's allergies indicates:   Allergen Reactions    Penicillins Hives    Streptomycin Hives    Sulfa (sulfonamide antibiotics) Hives       Diet: NPO    Activities: As tolerated    Nursing: Per Hospice Routine    Future Orders:  Hospice Medical Director may dictate new orders for comfortable care measures & sign death certificate.    Medications:         Comfort Care Medications Only          Roni Monsalve Jr.   Home Medication Instructions ISAIAH:[id number]    Printed on:10/13/17 6947   Medication Information                      alprazolam (XANAX) 0.25 MG tablet  TAKE 1 TABLET BY MOUTH 3 TIMES A DAY AS NEEDED FOR ANXIETY             amiodarone (PACERONE) 200 MG Tab  Take 1 tablet (200 mg total) by mouth once daily.                                         DIPHENHYDRAMINE HCL (DUKE'S SOLUTION)  Take 10 mLs by mouth 4 (four) times daily.             hydrocodone-acetaminophen  5-325mg (NORCO) 5-325 mg per tablet  Take 1 tablet by mouth every 6 (six) hours as needed for Pain.             metoprolol tartrate (LOPRESSOR) 50 MG tablet  Take 1 tablet (50 mg total) by mouth 2 (two) times daily.             moxifloxacin (AVELOX) 400 mg tablet  Take 1 tablet (400 mg total) by mouth once daily.  END DATE 10/19/17           nitroGLYCERIN (NITROSTAT) 0.4 MG SL tablet  Place 1 tablet (0.4 mg total) under the tongue every 5 (five) minutes as needed for Chest pain (if more than three needed go to ED or call MD.).             nystatin (MYCOSTATIN) cream               omeprazole (PRILOSEC) 40 MG capsule               ondansetron (ZOFRAN) 4 MG tablet  Take 1 tablet (4 mg total) by mouth every 6 (six) hours.             psyllium (METAMUCIL) packet  Take 1 packet by mouth 3 (three) times daily.             tramadol (ULTRAM) 50 mg tablet  Take 50 mg by mouth 2 (two) times daily as needed for Pain.                           _________________________________  Alison MD Jake  10/13/2017

## 2017-10-13 NOTE — HOSPITAL COURSE
10/13: MAGALIS. AAOx4. Still c/o productive coughs. He is on nasal canula 2-3L and short of breath when speaking. No fever/chills, N/V. Exam unchanged.

## 2017-10-13 NOTE — ASSESSMENT & PLAN NOTE
Nutrition Problem  Increased energy needs     Related to (etiology):   Esophageal cancer     Signs and Symptoms (as evidenced by):   10 % unintentional weight loss, dysphagia, and J-tube complications     Interventions/Recommendations (treatment strategy):  Please see RD recs in assessment note      Nutrition Diagnosis Status:   New

## 2017-10-13 NOTE — H&P
[facility] Medicine  History & Physical    Patient Name: Roni Monsalve Jr.  MRN: [id number]  Admission Date: 10/12/2017  Attending Physician: Alison Joseph MD   Primary Care Provider: YADIEL Reyna MD    [facility] Medicine Team: Networked reference to record PCT  Johanny Verde MD     Patient information was obtained from patient, spouse/SO, past medical records and ER records.     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    The providers of the history are the patient himself and his wife. Patient c/o persistent cough that is worse overnight for the past couple months, he has home oxygen that he rarely uses, and O2 sat without oxygen is at 93-93% at home. His wife mentions that patient has been filling up 1/2-1/3 of a vomiting bag daily with sputum. Also they state that the patient has become weaker since August, having difficulty ambulating even with a walker, and has had 3 falls since then. Before then he was able to ambulate independently, drive and manage his ADLs. The patient was diagnosed with cancer last year and has received chemotherapy + radiation with shrinkage of the tumor to some extent, however due to some stricture/obstruction he hasn't been able to have oral intake (even liquids) and therefore has a J-tube in, that per the patient and wife has come out several times and they inserted it back in themselves. The patient has not been interested in additional course of chemotherapy.  The patient has been smoking ~1PPD x 60 yr, recently decreased to a few cigarettes daily, also he is a former  daily alcohol drinker which has stopped since ~15 years ago.            Past Medical History:   Diagnosis Date    Abdominal aneurysm 2001    repaired     Cancer     skin - face and arm    Cardiac defibrillator in place     Cardiomyopathy     CHF (congestive heart failure)     COPD (chronic obstructive pulmonary disease)     Coronary artery disease     cardiac stent     Diverticulitis 2015    Heart attack 2000    Hepatitis B infection 1984    Hyperlipidemia LDL goal <70     Hypertension        Past Surgical History:   Procedure Laterality Date    ABDOMINAL SURGERY      CARDIAC DEFIBRILLATOR PLACEMENT  2015    upgraded to dual chamber Medtronic when device EOL due to sinus asael    CARDIAC DEFIBRILLATOR PLACEMENT  2006    single chamber    CORONARY STENT PLACEMENT      EYE SURGERY      HERNIA REPAIR      bilateral    SKIN BIOPSY      SKIN CANCER EXCISION      THORACOABDOMINAL AORTIC ANEURYSM REPAIR      VASCULAR SURGERY         Review of patient's allergies indicates:   Allergen Reactions    Penicillins Hives    Streptomycin Hives    Sulfa (sulfonamide antibiotics) Hives       No current facility-administered medications on file prior to encounter.      Current Outpatient Prescriptions on File Prior to Encounter   Medication Sig    alprazolam (XANAX) 0.25 MG tablet TAKE 1 TABLET BY MOUTH 3 TIMES A DAY AS NEEDED FOR ANXIETY    amiodarone (PACERONE) 200 MG Tab Take 1 tablet (200 mg total) by mouth once daily.    aspirin (ECOTRIN) 81 MG EC tablet Take 81 mg by mouth once daily.    atorvastatin (LIPITOR) 40 MG tablet Take 1 tablet (40 mg total) by mouth every evening.    DIPHENHYDRAMINE HCL (DUKE'S SOLUTION) Take 10 mLs by mouth 4 (four) times daily.    hydrocodone-acetaminophen 5-325mg (NORCO) 5-325 mg per tablet Take 1 tablet by mouth every 6 (six) hours as needed for Pain.    metoprolol tartrate (LOPRESSOR) 50 MG tablet Take 1 tablet (50 mg total) by mouth 2 (two) times daily.     nitroGLYCERIN (NITROSTAT) 0.4 MG SL tablet Place 1 tablet (0.4 mg total) under the tongue every 5 (five) minutes as needed for Chest pain (if more than three needed go to ED or call MD.).    nystatin (MYCOSTATIN) cream     omeprazole (PRILOSEC) 40 MG capsule     ondansetron (ZOFRAN) 4 MG tablet TAKE 1 TABLET BY MOUTH EVERY 12 HOURS AS NEEDED FOR NAUSEA    ondansetron (ZOFRAN) 4 MG tablet Take 1 tablet (4 mg total) by mouth every 6 (six) hours.    psyllium (METAMUCIL) packet Take 1 packet by mouth 3 (three) times daily.    tramadol (ULTRAM) 50 mg tablet      Family History     Problem Relation (Age of Onset)    Cancer Mother, Father    Heart disease Brother        Social History Main Topics    Smoking status: Current Every Day Smoker     Packs/day: 0.50     Types: Cigarettes    Smokeless tobacco: Never Used      Comment: quit 6 months ago     Alcohol use No      Comment: occasional    Drug use: No    Sexual activity: Not on file     Review of Systems   Constitutional: Negative for chills, fatigue and fever.   HENT: Positive for trouble swallowing. Negative for congestion, postnasal drip, rhinorrhea, sinus pressure and sore throat.    Eyes: Negative for discharge and redness.   Respiratory: Positive for cough, shortness of breath and wheezing.    Cardiovascular: Negative for chest pain and palpitations.   Gastrointestinal: Positive for abdominal pain. Negative for abdominal distention, blood in stool, constipation, diarrhea, nausea and vomiting.   Genitourinary: Negative for dysuria, frequency and hematuria.        Incontinence recent onset   Musculoskeletal: Negative for arthralgias and joint swelling.   Skin: Negative for color change and rash.   Allergic/Immunologic: Negative for food allergies.   Neurological: Positive for tremors and weakness. Negative for dizziness, numbness and headaches.   Psychiatric/Behavioral: Negative for agitation and confusion. The patient is not nervous/anxious.       Objective:     Vital Signs (Most Recent):  Temp: 98.1 °F (36.7 °C) (10/12/17 1500)  Pulse: 100 (10/12/17 1601)  Resp: 18 (10/12/17 1024)  BP: 108/78 (10/12/17 1601)  SpO2: 98 % (10/12/17 1601) Vital Signs (24h Range):  Temp:  [98 °F (36.7 °C)-98.1 °F (36.7 °C)] 98.1 °F (36.7 °C)  Pulse:  [] 100  Resp:  [18] 18  SpO2:  [94 %-98 %] 98 %  BP: ()/(58-91) 108/78     Weight: 72.6 kg (160 lb)  Body mass index is 21.7 kg/m².    Physical Exam   Constitutional: He is oriented to person, place, and time. He is cooperative. He appears ill. He appears distressed. Nasal cannula in place.   HENT:   Head: Normocephalic and atraumatic.   Mouth/Throat: Mucous membranes are dry.   Eyes: Conjunctivae and EOM are normal. Pupils are equal, round, and reactive to light.   Neck: Normal range of motion. Neck supple.   Cardiovascular: Normal rate and normal heart sounds.    Difficult to assess the heart sounds, very faint   Pulmonary/Chest: No tachypnea. He is in respiratory distress. He has no decreased breath sounds. He has wheezes in the right upper field and the left upper field. He has rhonchi in the right upper field and the left upper field. He has no rales.   The patient has SOB when speaking   Abdominal: Soft. Bowel sounds are normal. He exhibits no distension. There is generalized tenderness (mild). There is no guarding.   Midline laparotomy scar 2/2 AAA repair  J-tube in the left side of abdomen, without discharge, erythema or tenderness at the site   Musculoskeletal: Normal range of motion. He exhibits no edema or tenderness.   Neurological: He is alert and oriented to person, place, and time.   Skin: Skin is warm. No rash noted. He is not diaphoretic. No erythema.   Psychiatric: He has a normal mood and affect. His behavior is normal.   Nursing note and vitals reviewed.       Significant Labs:   BMP:   Recent Labs  Lab 10/12/17  1124   *      K 3.9      CO2 26   BUN 36*   CREATININE 0.9    CALCIUM 8.5*   MG 2.0     CBC:   Recent Labs  Lab 10/12/17  1124   WBC 17.35*   HGB 10.5*   HCT 34.6*   PLT 57*     Cardiac Markers:   Recent Labs  Lab 10/12/17  1124   *     Magnesium:   Recent Labs  Lab 10/12/17  1124   MG 2.0     Respiratory Culture: No results for input(s): GSRESP, RESPIRATORYC in the last 48 hours.  Urine Studies:   Recent Labs  Lab 10/12/17  1355   COLORU Aretha   APPEARANCEUA Hazy*   PHUR 5.0   SPECGRAV 1.025   PROTEINUA 2+*   GLUCUA 1+*   KETONESU Negative   BILIRUBINUA Negative   OCCULTUA Negative   NITRITE Negative   UROBILINOGEN 4.0   LEUKOCYTESUR Negative   RBCUA 4   WBCUA 3   BACTERIA None   HYALINECASTS 3*       Significant Imaging: CXR: I have reviewed all pertinent results/findings within the past 24 hours and my personal findings are:  BL pleural effusionand findings suggestive of pulmonary edema + left basilar infiltrate vs atelectasis  x-ray of J-tube was unremarkable    Assessment/Plan:     Goals of care, counseling/discussion    Will talk about goals of care and palliative options for the patient          Pneumonia of lower lobe due to infectious organism    No recent hospital admission or IV antibiotics.   Productive cough + SOB + subjective fever  BL rhonchi on the exam   WBC ~21719 with 90% PMN + infiltrates on CXR    Initial impression is CAP.    - will give oxygen  - was started on ceftriaxone 1 gr IV qd + moxifloxacin 400 qd in the ED  - will start breathing treatments with PRN duonebs for SOB + cough  - will f/u on the respiratory cultures          Cardiac arrhythmia    The patient has ischemic CMP and HF s/p dual chamber ICD in 2014 (single ICD 2006), with Hx of atrial flutter in 2016. He is on amiodarone and metoprolol at home    - will continue amiodarone 200 mg qd  - will continue metoprolol tartrate 25 mg bid        History of esophageal cancer      The patient has been diagnosed with adenocarcinoma of the lower esophagus 1 year ago and since he wasn't a  surgical candidate, has received chemo and radiation therapy.   He is not interested in additional courses of chemotherapy that was previously mentioned to him as an option by Dr. Howard.        CHF, chronic    Ischemic CMP and HFrEF ~20% on the last echo  No signs of volume overload on the exam  WCTM          Hyperlipidemia LDL goal <70    The most recent lipid profile is from 10/2014  - Cholesterol 146  - LDL 92.8  - HDL 39  - T cholesterol/HDL 3.7  - TG 71    - will continue on atorvastatin 40 mg qHS            VTE Risk Mitigation         Ordered     enoxaparin injection 40 mg  Daily     Route:  Subcutaneous        10/12/17 1854     High Risk of VTE  Once      10/12/17 1854             Johanny Verde MD   PGY-1, Internal Medicine  Department of Hospital Medicine [facility]

## 2017-10-13 NOTE — ASSESSMENT & PLAN NOTE
The patient has been diagnosed with adenocarcinoma of the lower esophagus 1 year ago and since he wasn't a surgical candidate, has received chemo and radiation therapy.   He is not interested in additional courses of chemotherapy that was previously mentioned to him as an option by Dr. Howard.

## 2017-10-13 NOTE — CONSULTS
[facility]  Palliative Medicine  Consult Note    Patient Name: Roni Monsalve Jr.  MRN: [id number]  Admission Date: 10/12/2017  Hospital Length of Stay: 1 days  Code Status: Full Code   Attending Provider: Alison Joseph MD Spoke to MD concerning consult. Per MD, call resident on her service.   Consulting Provider: GRACE Parikh  Primary Care Physician: YADIEL Reyna MD  Principal Problem:<principal problem not specified>    Patient information was obtained from patient and ER records.      Consults  Assessment/Plan:   Goals of care:  Palliative care encounter    Spoke to Dr. Ferrell. Per MD, [facility] care consulted to [facility] hospice with pt and hsi wife.     Pt in bed. Pt is alert, oriented to person, place, time, and situation.   Pt has good insight into his disease processes. Per pt, he has esophageal cancer. Per pt he did not want to continue with treatment. Pt was seeing Dr. Howard as outpt. Last visit March of this year. Per pt, he stated that treatment took away from hsi comfort and quality of life. Per pt he feels tired and weak and his goal is focusing on his comfort. Per pt, he is worries about his wife after he dies.     Explained to pt difference between home health and hospice care. Pt verbalized understanding. Explained to pt that after he passes, hospice will continue to follow wife for over a year providing grief counseling etc.   Per pt, wife went home for today because she did not sleep last night. Pt open to [facility] care APRN meeting with wife when she visit. Card with phone number left for pt.     Will continue to meet with pt and wife when available concerning goals of care.     Pt reports SOB with exertion. No symptoms noted at this time. Pt on O2 NC.  Pt reports no pain/discomfort at this time.             Thank you for your consult. I will follow-up with patient. Please contact us if you have any additional questions.    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     Per chart review, patient c/o persistent cough that is worse overnight for the past couple months Pt  has home oxygen that he rarely uses, and O2 sat without oxygen is at 93-93% at home. Per  Wife, shementions that patient has been filling up 1/2-1/3 of a vomiting bag daily with sputum. Also they state that the patient has become weaker since August, having difficulty ambulating even with a walker, and has had 3 falls since then. Before then he was able to ambulate independently, drive and manage his ADLs. The patient was diagnosed with cancer last year and has received chemotherapy + radiation with shrinkage of the tumor to some extent, however due to some stricture/obstruction he hasn't been able to have oral intake (even liquids) and therefore has a J-tube in, that per the patient and wife has come out several times and they inserted it back in themselves. The patient has not been interested in additional course of chemotherapy.  The patient has been smoking ~1PPD x 60 yr, recently decreased to a few cigarettes daily, also he is a former daily alcohol drinker which has stopped since ~15 years ago.       Hospital Course:  No notes on file        Past Medical History:   Diagnosis Date    Abdominal aneurysm 2001    repaired     Cancer     skin - face and arm    Cardiac defibrillator in place     Cardiomyopathy     CHF (congestive heart failure)     COPD (chronic obstructive pulmonary disease)     Coronary artery disease     cardiac stent     Diverticulitis 2015    Heart attack 2000    Hepatitis B infection 1984    Hyperlipidemia LDL goal <70     Hypertension        Past Surgical History:   Procedure Laterality Date    ABDOMINAL SURGERY      CARDIAC DEFIBRILLATOR PLACEMENT  2015    upgraded to dual chamber Medtronic when device  EOL due to sinus asael    CARDIAC DEFIBRILLATOR PLACEMENT  2006    single chamber    CORONARY STENT PLACEMENT      EYE SURGERY      HERNIA REPAIR      bilateral    SKIN BIOPSY      SKIN CANCER EXCISION      THORACOABDOMINAL AORTIC ANEURYSM REPAIR      VASCULAR SURGERY         Review of patient's allergies indicates:   Allergen Reactions    Penicillins Hives    Streptomycin Hives    Sulfa (sulfonamide antibiotics) Hives       Medications:  Continuous Infusions:   Scheduled Meds:   albuterol-ipratropium 2.5mg-0.5mg/3mL  3 mL Nebulization Q6H WAKE    amiodarone  200 mg Oral Daily    atorvastatin  40 mg Oral QHS    enoxaparin  40 mg Subcutaneous Daily    metoprolol tartrate  25 mg Oral BID    moxifloxacin  400 mg Intravenous Q24H     PRN Meds:hydrocodone-acetaminophen 5-325mg, ondansetron    Family History     Problem Relation (Age of Onset)    Cancer Mother, Father    Heart disease Brother        Social History Main Topics    Smoking status: Current Every Day Smoker     Packs/day: 0.50     Types: Cigarettes    Smokeless tobacco: Never Used      Comment: quit 6 months ago     Alcohol use No      Comment: occasional    Drug use: No    Sexual activity: Not on file       Review of Systems   Constitutional: Positive for activity change, appetite change and fatigue.   Respiratory: Positive for shortness of breath.    Genitourinary: Negative.    Neurological: Negative.    Psychiatric/Behavioral: Negative.      Objective:     Vital Signs (Most Recent):  Temp: 98.5 °F (36.9 °C) (10/13/17 0754)  Pulse: 94 (10/13/17 0956)  Resp: 20 (10/13/17 0754)  BP: (!) 108/59 (10/13/17 0956)  SpO2: (!) 92 % (10/13/17 0754) Vital Signs (24h Range):  Temp:  [98.1 °F (36.7 °C)-98.5 °F (36.9 °C)] 98.5 °F (36.9 °C)  Pulse:  [] 94  Resp:  [18-20] 20  SpO2:  [92 %-98 %] 92 %  BP: (104-151)/(57-91) 108/59     Weight: 78.2 kg (172 lb 6.4 oz)  Body mass index is 23.38 kg/m².    Review of Symptoms  Symptom Assessment (ESAS  0-10 scale)   ESAS 0 1 2 3 4 5 6 7 8 9 10   Pain X             Dyspnea X             Anxiety X             Nausea X             Depression  X             Anorexia X             Fatigue      X        Insomnia X             Restlessness  X             Agitation X             CAM / Delirium _X_ --  ___+   Constipation     _X_ --  ___+   Diarrhea           _X_ --  ___+  Bowel Management Plan (BMP): No      Pain Assessment: none noted    OME in 24 hours: 0    Performance Status: 60    ECOG Performance Status Grade: 3 - Confined to bed or chair 50% of waking hours    Physical Exam   Constitutional: He is oriented to person, place, and time. He is cooperative. He has a sickly appearance. Nasal cannula in place.   Cardiovascular: Normal rate and regular rhythm.    Pulmonary/Chest: He has wheezes in the right upper field and the left upper field.   Abdominal: Normal appearance and bowel sounds are normal.   J tube in place.   Neurological: He is alert and oriented to person, place, and time.   Skin: Skin is warm and dry.       Significant Labs: All pertinent labs within the past 24 hours have been reviewed.  CBC:     Recent Labs  Lab 10/13/17  0358   WBC 13.62*   HGB 8.9*   HCT 29.5*   MCV 94   PLT 61*     BMP:    Recent Labs  Lab 10/13/17  0358   GLU 98      K 4.7      CO2 25   BUN 32*   CREATININE 0.8   CALCIUM 8.2*   MG 1.8     LFT:  Lab Results   Component Value Date    AST 90 (H) 10/13/2017    ALKPHOS 124 10/13/2017    BILITOT 1.4 (H) 10/13/2017     Albumin:   Albumin   Date Value Ref Range Status   10/13/2017 2.0 (L) 3.5 - 5.2 g/dL Final     Protein:   Total Protein   Date Value Ref Range Status   10/13/2017 5.4 (L) 6.0 - 8.4 g/dL Final     Lactic acid:   Lab Results   Component Value Date    LACTATE 1.1 05/12/2016       Significant Imaging: I have reviewed all pertinent imaging results/findings within the past 24 hours.    Advanced Directives::  Living Will: No  LaPOST: No  Do Not Resuscitate Status:  No  Medical Power of :wife next of kin    Decision-Making Capacity: Patient unable to communicate due to disease severity/cognitive impairment    Living Arrangements: Lives with spouse. Pt has son and daughter from previous marriage. Pt has son from previous marriage. 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     Psychosocial/Cultural:  Per pt he has been  to wife around 18 years      Spiritual: not really    F- Sepideh and Belief: Confucianist    I - Importance: some what  .  C - Community: will have  visit    A - Address in Care: will have  visit      > 50% of 70 min visit spent in chart review, face to face discussion of goals of care,  symptom assessment, coordination of care and emotional support.    Suze Brooks, CNS  Palliative Medicine  [facility]

## 2017-10-13 NOTE — PROGRESS NOTES
"Met with pt's wife Ayesha who has good insight into pt's disease process. Pt sleeping and pt had given permission for this CASS to talk to his wife earlier today.    Per pt's wife, she has been having anticipatory grieving concerning future loss her . Support given to pt's wife. Per pt' s wife, pt has conveyed to her that he does not want to continue to come back and forth to hospital and that he is tired and "ready to go". Per wife, pt spends his time in his recliner chair and is very weak. Per wife, pt has been preparing her for when he dies.     Wife voices that hospice has been discussed. Education done with pt's wife. Per wife, her mother had home hospice. Wife voices that she feels pt is ready for hospice car at home. Per wife, pt is concerned with comfort at this time.      Support given. Spoke to Toan about pt's wife. Grief support groups given to pt's wife per pt's wife's  request.     Called and spoke to IM resident about conversation with pt earlier today and wife this afternoon.   Will continue to follow.     Suze GUTHRIE, APRN, AGCNS  "

## 2017-10-13 NOTE — SUBJECTIVE & OBJECTIVE
Past Medical History:   Diagnosis Date    Abdominal aneurysm 2001    repaired     Cancer     skin - face and arm    Cardiac defibrillator in place     Cardiomyopathy     CHF (congestive heart failure)     COPD (chronic obstructive pulmonary disease)     Coronary artery disease     cardiac stent     Diverticulitis 2015    Heart attack 2000    Hepatitis B infection 1984    Hyperlipidemia LDL goal <70     Hypertension        Past Surgical History:   Procedure Laterality Date    ABDOMINAL SURGERY      CARDIAC DEFIBRILLATOR PLACEMENT  2015    upgraded to dual chamber Medtronic when device EOL due to sinus asael    CARDIAC DEFIBRILLATOR PLACEMENT  2006    single chamber    CORONARY STENT PLACEMENT      EYE SURGERY      HERNIA REPAIR      bilateral    SKIN BIOPSY      SKIN CANCER EXCISION      THORACOABDOMINAL AORTIC ANEURYSM REPAIR      VASCULAR SURGERY         Review of patient's allergies indicates:   Allergen Reactions    Penicillins Hives    Streptomycin Hives    Sulfa (sulfonamide antibiotics) Hives       No current facility-administered medications on file prior to encounter.      Current Outpatient Prescriptions on File Prior to Encounter   Medication Sig    alprazolam (XANAX) 0.25 MG tablet TAKE 1 TABLET BY MOUTH 3 TIMES A DAY AS NEEDED FOR ANXIETY    amiodarone (PACERONE) 200 MG Tab Take 1 tablet (200 mg total) by mouth once daily.    hydrocodone-acetaminophen 5-325mg (NORCO) 5-325 mg per tablet Take 1 tablet by mouth every 6 (six) hours as needed for Pain.    metoprolol tartrate (LOPRESSOR) 50 MG tablet Take 1 tablet (50 mg total) by mouth 2 (two) times daily.    nitroGLYCERIN (NITROSTAT) 0.4 MG SL tablet Place 1 tablet (0.4 mg total) under the tongue every 5 (five) minutes as needed for Chest pain (if more than three needed go to ED or call MD.).    ondansetron (ZOFRAN) 4 MG tablet Take 1 tablet (4 mg total) by mouth every 6 (six) hours.    psyllium (METAMUCIL) packet Take 1  packet by mouth 3 (three) times daily.    tramadol (ULTRAM) 50 mg tablet Take 50 mg by mouth 2 (two) times daily as needed for Pain.     aspirin (ECOTRIN) 81 MG EC tablet Take 81 mg by mouth once daily.    atorvastatin (LIPITOR) 40 MG tablet Take 1 tablet (40 mg total) by mouth every evening.    DIPHENHYDRAMINE HCL (DUKE'S SOLUTION) Take 10 mLs by mouth 4 (four) times daily.    nystatin (MYCOSTATIN) cream     omeprazole (PRILOSEC) 40 MG capsule     [DISCONTINUED] ondansetron (ZOFRAN) 4 MG tablet TAKE 1 TABLET BY MOUTH EVERY 12 HOURS AS NEEDED FOR NAUSEA     Family History     Problem Relation (Age of Onset)    Cancer Mother, Father    Heart disease Brother        Social History Main Topics    Smoking status: Current Every Day Smoker     Packs/day: 0.50     Types: Cigarettes    Smokeless tobacco: Never Used      Comment: quit 6 months ago     Alcohol use No      Comment: occasional    Drug use: No    Sexual activity: Not on file     Review of Systems   Constitutional: Negative for chills, fatigue and fever.   HENT: Positive for trouble swallowing. Negative for congestion, postnasal drip, rhinorrhea, sinus pressure and sore throat.    Eyes: Negative for discharge and redness.   Respiratory: Positive for cough, shortness of breath and wheezing.    Cardiovascular: Negative for chest pain and palpitations.   Gastrointestinal: Positive for abdominal pain. Negative for abdominal distention, blood in stool, constipation, diarrhea, nausea and vomiting.   Genitourinary: Negative for dysuria, frequency and hematuria.        Incontinence recent onset   Musculoskeletal: Negative for arthralgias and joint swelling.   Skin: Negative for color change and rash.   Allergic/Immunologic: Negative for food allergies.   Neurological: Positive for tremors and weakness. Negative for dizziness, numbness and headaches.   Psychiatric/Behavioral: Negative for agitation and confusion. The patient is not nervous/anxious.       Objective:     Vital Signs (Most Recent):  Temp: 98.5 °F (36.9 °C) (10/13/17 1213)  Pulse: 95 (10/13/17 1233)  Resp: 16 (10/13/17 1233)  BP: (!) 109/58 (10/13/17 1213)  SpO2: (!) 93 % (10/13/17 1233) Vital Signs (24h Range):  Temp:  [98.1 °F (36.7 °C)-98.5 °F (36.9 °C)] 98.5 °F (36.9 °C)  Pulse:  [84-97] 95  Resp:  [16-20] 16  SpO2:  [92 %-97 %] 93 %  BP: (104-115)/(57-61) 109/58     Weight: 78.2 kg (172 lb 6.4 oz)  Body mass index is 23.38 kg/m².    Physical Exam   Constitutional: He is oriented to person, place, and time. He is cooperative. He appears ill. He appears distressed. Nasal cannula in place.   HENT:   Head: Normocephalic and atraumatic.   Mouth/Throat: Mucous membranes are dry.   Eyes: Conjunctivae and EOM are normal. Pupils are equal, round, and reactive to light.   Neck: Normal range of motion. Neck supple.   Cardiovascular: Normal rate and normal heart sounds.    Difficult to assess the heart sounds, very faint   Pulmonary/Chest: No tachypnea. He is in respiratory distress. He has no decreased breath sounds. He has wheezes in the right upper field and the left upper field. He has rhonchi in the right upper field and the left upper field. He has no rales.   The patient has SOB when speaking   Abdominal: Soft. Bowel sounds are normal. He exhibits no distension. There is generalized tenderness (mild). There is no guarding.   Midline laparotomy scar 2/2 AAA repair  J-tube in the left side of abdomen, without discharge, erythema or tenderness at the site   Musculoskeletal: Normal range of motion. He exhibits no edema or tenderness.   Neurological: He is alert and oriented to person, place, and time.   Skin: Skin is warm. No rash noted. He is not diaphoretic. No erythema.   Psychiatric: He has a normal mood and affect. His behavior is normal.   Nursing note and vitals reviewed.       Significant Labs:   BMP:     Recent Labs  Lab 10/13/17  0358   GLU 98      K 4.7      CO2 25   BUN 32*    CREATININE 0.8   CALCIUM 8.2*   MG 1.8     CBC:     Recent Labs  Lab 10/12/17  1124 10/13/17  0358   WBC 17.35* 13.62*   HGB 10.5* 8.9*   HCT 34.6* 29.5*   PLT 57* 61*     Cardiac Markers:     Recent Labs  Lab 10/12/17  1124   *     Magnesium:     Recent Labs  Lab 10/12/17  1124 10/13/17  0358   MG 2.0 1.8     Respiratory Culture: No results for input(s): GSRESP, RESPIRATORYC in the last 48 hours.  Urine Studies:     Recent Labs  Lab 10/12/17  1355   COLORU Aretha   APPEARANCEUA Hazy*   PHUR 5.0   SPECGRAV 1.025   PROTEINUA 2+*   GLUCUA 1+*   KETONESU Negative   BILIRUBINUA Negative   OCCULTUA Negative   NITRITE Negative   UROBILINOGEN 4.0   LEUKOCYTESUR Negative   RBCUA 4   WBCUA 3   BACTERIA None   HYALINECASTS 3*       Significant Imaging: CXR: I have reviewed all pertinent results/findings within the past 24 hours and my personal findings are:  BL pleural effusionand findings suggestive of pulmonary edema + left basilar infiltrate vs atelectasis  x-ray of J-tube was unremarkable

## 2017-10-13 NOTE — PLAN OF CARE
Problem: Patient Care Overview  Goal: Plan of Care Review  Outcome: Ongoing (interventions implemented as appropriate)  Plan of care reviewed with pt.  Understanding verbalized.  Pt awake and oriented x 4.  Afebrile.  VSS.  No complaints of pain throughout shift.  2 L NC in place.  HOB at 30 degrees.  Fall precautions in place.  Bed alarm set.  Bed in lowest position.  Call light and bedside table within reach.  Safety maintained throughout shift.

## 2017-10-13 NOTE — PLAN OF CARE
YADIEL Reyna MD  [address] / [place] [address]      [id number]. - [place], [place] - [address]  [address] [facility] [address]  Phone: [phone] Fax: [phone]    [facility] - [place], [place] - [address]  [address] [facility] [address]  Phone: [phone] Fax: [phone]      Payor: [employer] MEDICARE / Plan: [place] 65 / Product Type: Medicare Advantage /        10/13/17 1055   Discharge Assessment   Assessment Type Discharge Planning Assessment   Confirmed/corrected address and phone number on facesheet? Yes   Assessment information obtained from? Patient;Caregiver   Expected Length of Stay (days) 2   Communicated expected length of stay with patient/caregiver yes   Prior to hospitilization cognitive status: Alert/Oriented   Prior to hospitalization functional status: Independent;Assistive Equipment   Current cognitive status: Alert/Oriented   Current Functional Status: Independent;Assistive Equipment;Needs Assistance   Facility Arrived From: (home/selfcare)   Lives With spouse   Able to Return to Prior Arrangements unable to determine at this time (comments)   Is patient able to care for self after discharge? Unable to determine at this time (comments)   Who are your caregiver(s) and their phone number(s)? (Zamzam Monsalve, spouse, [phone])   Patient's perception of discharge disposition home or selfcare;home health   Readmission Within The Last 30 Days no previous admission in last 30 days   Patient currently being followed by outpatient case management? No   Patient currently receives any other outside agency services? No   Equipment Currently Used at Home bath bench;bedside commode;oxygen;walker, standard   Do you have any problems affording any of your prescribed medications? No   Is the patient taking medications as prescribed? yes   Does the patient have  transportation home? Yes   Transportation Available family or friend will provide   Does the patient receive services at the [facility]? No   Discharge Plan A Home;Home with family   Discharge Plan B Home;Home with family;Home Health   Patient/Family In Agreement With Plan yes

## 2017-10-13 NOTE — PLAN OF CARE
Problem: Nutrition, Enteral (Adult)  Intervention: Monitor/Manage Nutrition Support  Recommendation/Intervention:      1.Once J-tube placement confirmed, recommend Isosource 1.5 (or home formula, if preferred) to a goal rate of 65ml/hr with 120ml water flushes every 4 hours, as tolerated to appropriately meet EEN/ EPN.  2. If continuous feeds not tolerated, rec: bolus feeds Isosource 1.5 250 ml every 4 hours.  3. PO diet orders per SLP or MD.     Goals: TF regime to start by f/u visit.   Nutrition Goal Status: new  Communication of RD Recs: reviewed with RN

## 2017-10-13 NOTE — ASSESSMENT & PLAN NOTE
Spoke to Dr. Ferrell. Per MD, [facility] care [facility] with [facility] and hsi wife.     Pt in bed. Pt is alert, oriented to person, place, time, and situation.   Pt has good insight into his disease processes. Per pt, he has esophageal cancer. Per pt he did not want to continue with treatment. Pt was seeing Dr. Howard as outpt. Last visit March of this year. Per pt, he stated that treatment took away from hsi comfort and quality of life. Per pt he feels tired and weak and his goal is focusing on his comfort. Per pt, he is worries about his wife after he dies.     Explained to pt difference between home health and hospice care. Pt verbalized understanding. Explained to pt that after he passes, hospice will continue to follow wife for over a year providing grief counseling etc.   Per pt, wife went home for today because she did not sleep last night. Pt open to [facility] care APRN meeting with wife when she visit. Card with phone number left for pt.     Will continue to meet with pt and wife when available concerning goals of care.     Pt reports SOB with exertion. No symptoms noted at this time. Pt on O2 NC.  Pt reports no pain/discomfort at this time.

## 2017-10-13 NOTE — ASSESSMENT & PLAN NOTE
The most recent lipid profile is from 10/2014  - Cholesterol 146  - LDL 92.8  - HDL 39  - T cholesterol/HDL 3.7  - TG 71    - will continue on atorvastatin 40 mg qHS

## 2017-10-13 NOTE — CONSULTS
"  [facility]  Adult Nutrition  Consult Note    SUMMARY     Recommendations  Recommendation/Intervention:     1.Once J-tube placement confirmed, recommend Isosource 1.5 (or home formula, if preferred) to a goal rate of 65ml/hr with 120ml water flushes every 4 hours, as tolerated to appropriately meet EEN/ EPN.  2. If continuous feeds not tolerated, rec: bolus feeds Isosource 1.5 250 ml every 4 hours.  3. PO diet orders per SLP or MD.    Goals: TF regime to start by f/u visit.   Nutrition Goal Status: new  Communication of RD Recs: reviewed with RN    Reason for Assessment    Reason for Assessment: physician consult  Diagnosis: cancer diagnosis/related complications (esophageal cancer)  Relevent Medical History: cancer, CHF, HTN, COPD, diverticulitis   Interdisciplinary Rounds: did not attend  Level of Care: Nutrition Support  General Information Comments: Pt. reports home tube feeds managed by wife. J-tube fell out several times, checking placement today. May need to suture it, no feeds running at this time. During visit, pt. states he is "ready to die". Notified RN and also discussed pt.'s J-tube placement/ TFs. Pt. is not proceeding with further cancer tx.   Nutrition Discharge Planning: NIGEL at this time. Palliative care being discussed.     Nutrition Prescription Ordered    Current Diet Order: No diet ordered  Nutrition Order Comments: TF to start once placement of J-tube confirmed     Evaluation of Received Nutrients/Fluid Intake    % Intake of Estimated Energy Needs: 0 - 25 %  % Meal Intake: 0%     Nutrition Risk Screen     Nutrition Risk Screen: dysphagia or difficulty swallowing, tube feeding or parenteral nutrition    Nutrition/Diet History    Patient Reported Diet/Restrictions/Preferences: no oral intake  Typical Food/Fluid Intake: ice, popsicles   Factors Affecting Nutritional Intake: difficulty/impaired swallowing, painful swallowing  Nutrition Support Formula Prior to Admit: " [facility]    Labs/Tests/Procedures/Meds    Pertinent Labs Reviewed: reviewed, pertinent  Pertinent Labs Comments: High: WBC 13.6, BUN 32; Low:  H/H 8.9/29.5, Ca 8.2, Alb 2.0  Pertinent Medications Reviewed: reviewed    Physical Findings    Overall Physical Appearance: loss of muscle mass, weak  Tubes: jejunostomy tube     Anthropometrics    Height: 6' (182.9 cm)  Weight Method: Bed Scale  Weight: 78.2 kg (172 lb 6.4 oz)  Ideal Body Weight (IBW), Male: 178 lb  % Ideal Body Weight, Male (lb): 96.85 lb  BMI (Calculated): 23.4  BMI Grade: 18.5-24.9 - normal  Weight Loss: unintentional (Pt. reports UBW of 190lbs. before being diagnosed.)  Usual Body Weight (UBW), k kg  % Usual Body Weight: 91.12  % Weight Change From Usual Weight: -9.07 %    Estimated/Assessed Needs    Weight Used For Calorie Calculations: 78.2 kg (172 lb 6.4 oz)   Energy Calorie Requirements (kcal): 8317-1050 (30-35 kcal/kg)  Energy Need Method: Kcal/kg (30-35kcal/kg)  RMR (Winston-St. Jeor Equation): 1545  Weight Used For Protein Calculations: 78.2 kg (172 lb 6.4 oz)  Protein Requirements:  (1.0-1.5 g/kg)  Fluid Requirements (mL): 2340 (or per MD)  Fluid Need Method: RDA Method  RDA Method (mL): 2340      Assessment and Plan  Nutrition Problem  Increased energy needs    Related to (etiology):   Esophageal cancer    Signs and Symptoms (as evidenced by):   10 % unintentional weight loss, dysphagia, and J-tube complications    Interventions/Recommendations (treatment strategy):  Please see RD recs above     Nutrition Diagnosis Status:   New      Monitor and Evaluation    Food and Nutrient Intake: energy intake, enteral nutrition intake  Food and Nutrient Adminstration: diet order, enteral and parenteral nutrition administration  Physical Activity and Function: nutrition-related ADLs and IADLs  Anthropometric Measurements: weight, weight change  Biochemical Data, Medical Tests and Procedures: electrolyte and renal panel, gastrointestinal profile,  glucose/endocrine profile, inflammatory profile, lipid profile  Nutrition-Focused Physical Findings: overall appearance    Nutrition Risk    RD to f/u 2x week    Nutrition Follow-Up    RD Follow-up?: Yes

## 2017-10-13 NOTE — ASSESSMENT & PLAN NOTE
The patient has ischemic CMP and HF s/p dual chamber ICD in 2014 (single ICD 2006), with Hx of atrial flutter in 2016. He is on amiodarone and metoprolol at home    - will continue amiodarone 200 mg qd  - will continue metoprolol tartrate 25 mg bid

## 2017-10-13 NOTE — SUBJECTIVE & OBJECTIVE
Past Medical History:   Diagnosis Date    Abdominal aneurysm 2001    repaired     Cancer     skin - face and arm    Cardiac defibrillator in place     Cardiomyopathy     CHF (congestive heart failure)     COPD (chronic obstructive pulmonary disease)     Coronary artery disease     cardiac stent     Diverticulitis 2015    Heart attack 2000    Hepatitis B infection 1984    Hyperlipidemia LDL goal <70     Hypertension        Past Surgical History:   Procedure Laterality Date    ABDOMINAL SURGERY      CARDIAC DEFIBRILLATOR PLACEMENT  2015    upgraded to dual chamber Medtronic when device EOL due to sinus asael    CARDIAC DEFIBRILLATOR PLACEMENT  2006    single chamber    CORONARY STENT PLACEMENT      EYE SURGERY      HERNIA REPAIR      bilateral    SKIN BIOPSY      SKIN CANCER EXCISION      THORACOABDOMINAL AORTIC ANEURYSM REPAIR      VASCULAR SURGERY         Review of patient's allergies indicates:   Allergen Reactions    Penicillins Hives    Streptomycin Hives    Sulfa (sulfonamide antibiotics) Hives       Medications:  Continuous Infusions:   Scheduled Meds:   albuterol-ipratropium 2.5mg-0.5mg/3mL  3 mL Nebulization Q6H WAKE    amiodarone  200 mg Oral Daily    atorvastatin  40 mg Oral QHS    enoxaparin  40 mg Subcutaneous Daily    metoprolol tartrate  25 mg Oral BID    moxifloxacin  400 mg Intravenous Q24H     PRN Meds:hydrocodone-acetaminophen 5-325mg, ondansetron    Family History     Problem Relation (Age of Onset)    Cancer Mother, Father    Heart disease Brother        Social History Main Topics    Smoking status: Current Every Day Smoker     Packs/day: 0.50     Types: Cigarettes    Smokeless tobacco: Never Used      Comment: quit 6 months ago     Alcohol use No      Comment: occasional    Drug use: No    Sexual activity: Not on file       Review of Systems   Constitutional: Positive for activity change, appetite change and fatigue.   Respiratory: Positive for shortness of  breath.    Genitourinary: Negative.    Neurological: Negative.    Psychiatric/Behavioral: Negative.      Objective:     Vital Signs (Most Recent):  Temp: 98.5 °F (36.9 °C) (10/13/17 0754)  Pulse: 94 (10/13/17 0956)  Resp: 20 (10/13/17 0754)  BP: (!) 108/59 (10/13/17 0956)  SpO2: (!) 92 % (10/13/17 0754) Vital Signs (24h Range):  Temp:  [98.1 °F (36.7 °C)-98.5 °F (36.9 °C)] 98.5 °F (36.9 °C)  Pulse:  [] 94  Resp:  [18-20] 20  SpO2:  [92 %-98 %] 92 %  BP: (104-151)/(57-91) 108/59     Weight: 78.2 kg (172 lb 6.4 oz)  Body mass index is 23.38 kg/m².    Review of Symptoms  Symptom Assessment (ESAS 0-10 scale)   ESAS 0 1 2 3 4 5 6 7 8 9 10   Pain X             Dyspnea X             Anxiety X             Nausea X             Depression  X             Anorexia X             Fatigue      X        Insomnia X             Restlessness  X             Agitation X             CAM / Delirium _X_ --  ___+   Constipation     _X_ --  ___+   Diarrhea           _X_ --  ___+  Bowel Management Plan (BMP): No      Pain Assessment: none noted    OME in 24 hours: 0    Performance Status: 60    ECOG Performance Status Grade: 3 - Confined to bed or chair 50% of waking hours    Physical Exam   Constitutional: He is oriented to person, place, and time. He is cooperative. He has a sickly appearance. Nasal cannula in place.   Cardiovascular: Normal rate and regular rhythm.    Pulmonary/Chest: He has wheezes in the right upper field and the left upper field.   Abdominal: Normal appearance and bowel sounds are normal.   J tube in place.   Neurological: He is alert and oriented to person, place, and time.   Skin: Skin is warm and dry.       Significant Labs: All pertinent labs within the past 24 hours have been reviewed.  CBC:     Recent Labs  Lab 10/13/17  0358   WBC 13.62*   HGB 8.9*   HCT 29.5*   MCV 94   PLT 61*     BMP:    Recent Labs  Lab 10/13/17  0358   GLU 98      K 4.7      CO2 25   BUN 32*   CREATININE 0.8   CALCIUM 8.2*    MG 1.8     LFT:  Lab Results   Component Value Date    AST 90 (H) 10/13/2017    ALKPHOS 124 10/13/2017    BILITOT 1.4 (H) 10/13/2017     Albumin:   Albumin   Date Value Ref Range Status   10/13/2017 2.0 (L) 3.5 - 5.2 g/dL Final     Protein:   Total Protein   Date Value Ref Range Status   10/13/2017 5.4 (L) 6.0 - 8.4 g/dL Final     Lactic acid:   Lab Results   Component Value Date    LACTATE 1.1 05/12/2016       Significant Imaging: I have reviewed all pertinent imaging results/findings within the past 24 hours.    Advanced Directives::  Living Will: No  LaPOST: No  Do Not Resuscitate Status: No  Medical Power of :wife next of kin    Decision-Making Capacity: Patient unable to communicate due to disease severity/cognitive impairment    Living Arrangements: Lives with spouse. Pt has son and daughter from previous marriage. Pt has son from previous marriage. 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     Psychosocial/Cultural:  Per pt he has been  to wife around 18 years      Spiritual: not really    F- Sepideh and Belief: Nondenominational    I - Importance: some what  .  C - Community: will have  visit    A - Address in Care: will have  visit

## 2017-10-14 VITALS
OXYGEN SATURATION: 98 % | DIASTOLIC BLOOD PRESSURE: 57 MMHG | BODY MASS INDEX: 23.35 KG/M2 | TEMPERATURE: 99 F | HEIGHT: 72 IN | HEART RATE: 88 BPM | SYSTOLIC BLOOD PRESSURE: 119 MMHG | WEIGHT: 172.38 LBS | RESPIRATION RATE: 18 BRPM

## 2017-10-14 PROCEDURE — 25000242 PHARM REV CODE 250 ALT 637 W/ HCPCS: Performed by: INTERNAL MEDICINE

## 2017-10-14 PROCEDURE — 25000003 PHARM REV CODE 250: Performed by: STUDENT IN AN ORGANIZED HEALTH CARE EDUCATION/TRAINING PROGRAM

## 2017-10-14 PROCEDURE — 99239 HOSP IP/OBS DSCHRG MGMT >30: CPT | Mod: GC,,, | Performed by: INTERNAL MEDICINE

## 2017-10-14 PROCEDURE — 25000003 PHARM REV CODE 250: Performed by: INTERNAL MEDICINE

## 2017-10-14 PROCEDURE — 27000221 HC OXYGEN, UP TO 24 HOURS

## 2017-10-14 PROCEDURE — 63600175 PHARM REV CODE 636 W HCPCS: Performed by: INTERNAL MEDICINE

## 2017-10-14 PROCEDURE — 94640 AIRWAY INHALATION TREATMENT: CPT

## 2017-10-14 PROCEDURE — 94761 N-INVAS EAR/PLS OXIMETRY MLT: CPT

## 2017-10-14 RX ORDER — MOXIFLOXACIN HYDROCHLORIDE 400 MG/1
400 TABLET ORAL DAILY
Qty: 7 TABLET | Refills: 0 | Status: SHIPPED | OUTPATIENT
Start: 2017-10-14

## 2017-10-14 RX ORDER — FUROSEMIDE 20 MG/1
20 TABLET ORAL DAILY PRN
Qty: 60 TABLET | Refills: 11
Start: 2017-10-14 | End: 2018-10-14

## 2017-10-14 RX ORDER — FUROSEMIDE 10 MG/ML
40 INJECTION INTRAMUSCULAR; INTRAVENOUS ONCE
Status: COMPLETED | OUTPATIENT
Start: 2017-10-14 | End: 2017-10-14

## 2017-10-14 RX ADMIN — IPRATROPIUM BROMIDE AND ALBUTEROL SULFATE 3 ML: .5; 3 SOLUTION RESPIRATORY (INHALATION) at 08:10

## 2017-10-14 RX ADMIN — FUROSEMIDE 40 MG: 10 INJECTION, SOLUTION INTRAVENOUS at 09:10

## 2017-10-14 RX ADMIN — AMIODARONE HYDROCHLORIDE 200 MG: 200 TABLET ORAL at 09:10

## 2017-10-14 RX ADMIN — METOPROLOL TARTRATE 25 MG: 25 TABLET, FILM COATED ORAL at 09:10

## 2017-10-14 NOTE — ASSESSMENT & PLAN NOTE
spoke about goals of care with the patient and his wife  Palliative care has seen the patient, they have decided to go home with hospice  Will send home on oral antibiotics tomorrow

## 2017-10-14 NOTE — ASSESSMENT & PLAN NOTE
No recent hospital admission or IV antibiotics.   Productive cough + SOB + subjective fever  BL rhonchi on the exam   WBC ~89601 with 90% PMN + infiltrates on CXR    Initial impression is CAP vs aspiration pneumonia    - will give oxygen  - will discontinue ceftriaxone and continue on moxifloxacin 400 qd in the ED  - will continue breathing treatments with PRN duonebs for SOB + cough  - will f/u on the respiratory cultures

## 2017-10-14 NOTE — PLAN OF CARE
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       CHF, chronic [I50.9]  Yes    Hyperlipidemia LDL goal <70 [E78.5]  Yes      Resolved Hospital Problems    Diagnosis Date Resolved POA   No resolved problems to display.       Hospice Qualifying Diagnoses: esophageal cancer, CHF       Patient has a life expectancy < 6 months due to these conditions.    Vital Signs: Routine per Hospice Protocol.    Allergies:  Review of patient's allergies indicates:   Allergen Reactions    Penicillins Hives    Streptomycin Hives    Sulfa (sulfonamide antibiotics) Hives       Diet: comfort feeds as tolerated    Activities: As tolerated    Nursing: Per Hospice Routine    Future Orders:  Hospice Medical Director may dictate new orders for comfortable care measures & sign death certificate.    Medications:         Comfort Care Medications Only      Roni Monsalve Jr.   Home Medication Instructions ISAIAH:[id number]    Printed on:10/14/17 1026   Medication Information                      alprazolam (XANAX) 0.25 MG tablet  TAKE 1 TABLET BY MOUTH 3 TIMES A DAY AS NEEDED FOR ANXIETY             amiodarone (PACERONE) 200 MG Tab  Take 1 tablet (200 mg total) by mouth once daily.             DIPHENHYDRAMINE HCL (DUKE'S SOLUTION)  Take 10 mLs by mouth 4 (four) times daily.             furosemide (LASIX) 20 MG  tablet  Take 1 tablet (20 mg total) by mouth daily as needed (dyspnea).             hydrocodone-acetaminophen 5-325mg (NORCO) 5-325 mg per tablet  Take 1 tablet by mouth every 6 (six) hours as needed for Pain.             metoprolol tartrate (LOPRESSOR) 50 MG tablet  Take 1 tablet (50 mg total) by mouth 2 (two) times daily.  END 10/19/17           moxifloxacin (AVELOX) 400 mg tablet  Take 1 tablet (400 mg total) by mouth once daily.             nitroGLYCERIN (NITROSTAT) 0.4 MG SL tablet  Place 1 tablet (0.4 mg total) under the tongue every 5 (five) minutes as needed for Chest pain (if more than three needed go to ED or call MD.).             nystatin (MYCOSTATIN) cream               omeprazole (PRILOSEC) 40 MG capsule               ondansetron (ZOFRAN) 4 MG tablet  Take 1 tablet (4 mg total) by mouth every 6 (six) hours.             psyllium (METAMUCIL) packet  Take 1 packet by mouth 3 (three) times daily.             tramadol (ULTRAM) 50 mg tablet  Take 50 mg by mouth 2 (two) times daily as needed for Pain.                             _________________________________  Alison Joseph MD  10/14/2017

## 2017-10-14 NOTE — PLAN OF CARE
Nurse given information to call report.  Transportation scheduled with Андрей ([id number]) Auth#[id number].   time is 12:00noon.    Lala Monsalve LMSW

## 2017-10-14 NOTE — PROGRESS NOTES
[facility] Hospital Medicine  Progress Note    Patient Name: Roni Monsalve Jr.  MRN: [id number]  Patient Class: IP- Inpatient   Admission Date: 10/12/2017  Length of Stay: 1 days  Attending Physician: Alison Joseph MD  Primary Care Provider: YADIEL Reyna MD    [facility] Medicine Team: Networked reference to record PCT  Johanny Verde MD    Subjective:     Principal Problem:Pneumonia of lower lobe due to infectious organism    HPI:  The patient is a 78 y/o M with CAD, HTN, HLD, HFrEF (20%), COPD, and adenocarcinoma of the lower third of esophagus who presented to the ED from the GI clinic due to worsening of SOB, and a productive cough.    The providers of the history are the patient himself and his wife. Patient c/o persistent cough that is worse overnight for the past couple months, he has home oxygen that he rarely uses, and O2 sat without oxygen is at 93-93% at home. His wife mentions that patient has been filling up 1/2-1/3 of a vomiting bag daily with sputum. Also they state that the patient has become weaker since August, having difficulty ambulating even with a walker, and has had 3 falls since then. Before then he was able to ambulate independently, drive and manage his ADLs. The patient was diagnosed with cancer last year and has received chemotherapy + radiation with shrinkage of the tumor to some extent, however due to some stricture/obstruction he hasn't been able to have oral intake (even liquids) and therefore has a J-tube in, that per the patient and wife has come out several times and they inserted it back in themselves. The patient has not been interested in additional course of chemotherapy.  The patient has been smoking ~1PPD x 60 yr, recently decreased to a few cigarettes daily, also he is a former daily alcohol drinker which has stopped since ~15 years ago.            Hospital Course:  10/13: NAEON. AAOx4. Still c/o productive coughs. He is on nasal canula 2-3L and short of  breath when speaking. No fever/chills, N/V. Exam unchanged.      Past Medical History:   Diagnosis Date    Abdominal aneurysm 2001    repaired     Cancer     skin - face and arm    Cardiac defibrillator in place     Cardiomyopathy     CHF (congestive heart failure)     COPD (chronic obstructive pulmonary disease)     Coronary artery disease     cardiac stent     Diverticulitis 2015    Heart attack 2000    Hepatitis B infection 1984    Hyperlipidemia LDL goal <70     Hypertension        Past Surgical History:   Procedure Laterality Date    ABDOMINAL SURGERY      CARDIAC DEFIBRILLATOR PLACEMENT  2015    upgraded to dual chamber Medtronic when device EOL due to sinus asael    CARDIAC DEFIBRILLATOR PLACEMENT  2006    single chamber    CORONARY STENT PLACEMENT      EYE SURGERY      HERNIA REPAIR      bilateral    SKIN BIOPSY      SKIN CANCER EXCISION      THORACOABDOMINAL AORTIC ANEURYSM REPAIR      VASCULAR SURGERY         Review of patient's allergies indicates:   Allergen Reactions    Penicillins Hives    Streptomycin Hives    Sulfa (sulfonamide antibiotics) Hives       No current facility-administered medications on file prior to encounter.      Current Outpatient Prescriptions on File Prior to Encounter   Medication Sig    alprazolam (XANAX) 0.25 MG tablet TAKE 1 TABLET BY MOUTH 3 TIMES A DAY AS NEEDED FOR ANXIETY    amiodarone (PACERONE) 200 MG Tab Take 1 tablet (200 mg total) by mouth once daily.    hydrocodone-acetaminophen 5-325mg (NORCO) 5-325 mg per tablet Take 1 tablet by mouth every 6 (six) hours as needed for Pain.    metoprolol tartrate (LOPRESSOR) 50 MG tablet Take 1 tablet (50 mg total) by mouth 2 (two) times daily.    nitroGLYCERIN (NITROSTAT) 0.4 MG SL tablet Place 1 tablet (0.4 mg total) under the tongue every 5 (five) minutes as needed for Chest pain (if more than three needed go to ED or call MD.).    ondansetron (ZOFRAN) 4 MG tablet Take 1 tablet (4 mg total) by mouth  every 6 (six) hours.    psyllium (METAMUCIL) packet Take 1 packet by mouth 3 (three) times daily.    tramadol (ULTRAM) 50 mg tablet Take 50 mg by mouth 2 (two) times daily as needed for Pain.     aspirin (ECOTRIN) 81 MG EC tablet Take 81 mg by mouth once daily.    atorvastatin (LIPITOR) 40 MG tablet Take 1 tablet (40 mg total) by mouth every evening.    DIPHENHYDRAMINE HCL (DUKE'S SOLUTION) Take 10 mLs by mouth 4 (four) times daily.    nystatin (MYCOSTATIN) cream     omeprazole (PRILOSEC) 40 MG capsule     [DISCONTINUED] ondansetron (ZOFRAN) 4 MG tablet TAKE 1 TABLET BY MOUTH EVERY 12 HOURS AS NEEDED FOR NAUSEA     Family History     Problem Relation (Age of Onset)    Cancer Mother, Father    Heart disease Brother        Social History Main Topics    Smoking status: Current Every Day Smoker     Packs/day: 0.50     Types: Cigarettes    Smokeless tobacco: Never Used      Comment: quit 6 months ago     Alcohol use No      Comment: occasional    Drug use: No    Sexual activity: Not on file     Review of Systems   Constitutional: Negative for chills, fatigue and fever.   HENT: Positive for trouble swallowing. Negative for congestion, postnasal drip, rhinorrhea, sinus pressure and sore throat.    Eyes: Negative for discharge and redness.   Respiratory: Positive for cough, shortness of breath and wheezing.    Cardiovascular: Negative for chest pain and palpitations.   Gastrointestinal: Positive for abdominal pain. Negative for abdominal distention, blood in stool, constipation, diarrhea, nausea and vomiting.   Genitourinary: Negative for dysuria, frequency and hematuria.        Incontinence recent onset   Musculoskeletal: Negative for arthralgias and joint swelling.   Skin: Negative for color change and rash.   Allergic/Immunologic: Negative for food allergies.   Neurological: Positive for tremors and weakness. Negative for dizziness, numbness and headaches.   Psychiatric/Behavioral: Negative for agitation and  confusion. The patient is not nervous/anxious.      Objective:     Vital Signs (Most Recent):  Temp: 98.5 °F (36.9 °C) (10/13/17 1213)  Pulse: 95 (10/13/17 1233)  Resp: 16 (10/13/17 1233)  BP: (!) 109/58 (10/13/17 1213)  SpO2: (!) 93 % (10/13/17 1233) Vital Signs (24h Range):  Temp:  [98.1 °F (36.7 °C)-98.5 °F (36.9 °C)] 98.5 °F (36.9 °C)  Pulse:  [84-97] 95  Resp:  [16-20] 16  SpO2:  [92 %-97 %] 93 %  BP: (104-115)/(57-61) 109/58     Weight: 78.2 kg (172 lb 6.4 oz)  Body mass index is 23.38 kg/m².    Physical Exam   Constitutional: He is oriented to person, place, and time. He is cooperative. He appears ill. He appears distressed. Nasal cannula in place.   HENT:   Head: Normocephalic and atraumatic.   Mouth/Throat: Mucous membranes are dry.   Eyes: Conjunctivae and EOM are normal. Pupils are equal, round, and reactive to light.   Neck: Normal range of motion. Neck supple.   Cardiovascular: Normal rate and normal heart sounds.    Difficult to assess the heart sounds, very faint   Pulmonary/Chest: No tachypnea. He is in respiratory distress. He has no decreased breath sounds. He has wheezes in the right upper field and the left upper field. He has rhonchi in the right upper field and the left upper field. He has no rales.   The patient has SOB when speaking   Abdominal: Soft. Bowel sounds are normal. He exhibits no distension. There is generalized tenderness (mild). There is no guarding.   Midline laparotomy scar 2/2 AAA repair  J-tube in the left side of abdomen, without discharge, erythema or tenderness at the site   Musculoskeletal: Normal range of motion. He exhibits no edema or tenderness.   Neurological: He is alert and oriented to person, place, and time.   Skin: Skin is warm. No rash noted. He is not diaphoretic. No erythema.   Psychiatric: He has a normal mood and affect. His behavior is normal.   Nursing note and vitals reviewed.       Significant Labs:   BMP:     Recent Labs  Lab 10/13/17  0358   GLU 98       K 4.7      CO2 25   BUN 32*   CREATININE 0.8   CALCIUM 8.2*   MG 1.8     CBC:     Recent Labs  Lab 10/12/17  1124 10/13/17  0358   WBC 17.35* 13.62*   HGB 10.5* 8.9*   HCT 34.6* 29.5*   PLT 57* 61*     Cardiac Markers:     Recent Labs  Lab 10/12/17  1124   *     Magnesium:     Recent Labs  Lab 10/12/17  1124 10/13/17  0358   MG 2.0 1.8     Respiratory Culture: No results for input(s): GSRESP, RESPIRATORYC in the last 48 hours.  Urine Studies:     Recent Labs  Lab 10/12/17  1355   COLORU Aretha   APPEARANCEUA Hazy*   PHUR 5.0   SPECGRAV 1.025   PROTEINUA 2+*   GLUCUA 1+*   KETONESU Negative   BILIRUBINUA Negative   OCCULTUA Negative   NITRITE Negative   UROBILINOGEN 4.0   LEUKOCYTESUR Negative   RBCUA 4   WBCUA 3   BACTERIA None   HYALINECASTS 3*       Significant Imaging: CXR: I have reviewed all pertinent results/findings within the past 24 hours and my personal findings are:  BL pleural effusionand findings suggestive of pulmonary edema + left basilar infiltrate vs atelectasis  x-ray of J-tube was unremarkable    Assessment/Plan:      * Pneumonia of lower lobe due to infectious organism    No recent hospital admission or IV antibiotics.   Productive cough + SOB + subjective fever  BL rhonchi on the exam   WBC ~25569 with 90% PMN + infiltrates on CXR    Initial impression is CAP vs aspiration pneumonia    - will give oxygen  - will discontinue ceftriaxone and continue on moxifloxacin 400 qd in the ED  - will continue breathing treatments with PRN duonebs for SOB + cough  - will f/u on the respiratory cultures          Goals of care, counseling/discussion    spoke about goals of care with the patient and his wife  Palliative care has seen the patient, they have decided to go home with hospice  Will send home on oral antibiotics tomorrow          Cardiac arrhythmia    The patient has ischemic CMP and HF s/p dual chamber ICD in 2014 (single ICD 2006), with Hx of atrial flutter in 2016. He is on  amiodarone and metoprolol at home    - will continue amiodarone 200 mg qd  - will continue metoprolol tartrate 25 mg bid        History of esophageal cancer      The patient has been diagnosed with adenocarcinoma of the lower esophagus 1 year ago and since he wasn't a surgical candidate, has received chemo and radiation therapy.   He is not interested in additional courses of chemotherapy that was previously mentioned to him as an option by Dr. Howard.        CHF, chronic    Ischemic CMP and HFrEF ~20% on the last echo  No signs of volume overload on the exam  WC          Hyperlipidemia LDL goal <70    The most recent lipid profile is from 10/2014  - Cholesterol 146  - LDL 92.8  - HDL 39  - T cholesterol/HDL 3.7  - TG 71    - will continue on atorvastatin 40 mg qHS            VTE Risk Mitigation         Ordered     enoxaparin injection 40 mg  Daily     Route:  Subcutaneous        10/12/17 1854     High Risk of VTE  Once      10/12/17 1854              Johanny Verde MD  Department of Hospital Medicine   [facility]

## 2017-10-14 NOTE — PLAN OF CARE
Problem: Patient Care Overview  Goal: Plan of Care Review  Outcome: Ongoing (interventions implemented as appropriate)  No acute changes overnight. VSS,afebrile. SPO2-91% on 2L/NC. Pt was started on tube feedings overnight. Nutren feedings at 50cc/hr. Pt tolerated well. Small water flush with meds through J tube. No skin breakdown noted. Pt will call staff when needed.

## 2017-10-14 NOTE — PLAN OF CARE
MSW met with pt and wife at bedside.  They would like [facility].  Referral sent to Wyatt at [facility].  Rep to meet with family today.  Anticipated discharge is today.    Lala Monsalve LMSW  [id number]

## 2017-10-15 NOTE — DISCHARGE SUMMARY
DISCHARGE SUMMARY  Hospital Medicine    Team: Networked reference to record PCT     Patient Name: Roni Monsalve Jr.  YOB: 1940    Admit Date: 10/12/2017    Discharge Date: 10/14/2017    Discharge Attending Physician: No att. 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       CHF, chronic [I50.9]  Yes    Hyperlipidemia LDL goal <70 [E78.5]  Yes      Resolved Hospital Problems    Diagnosis Date Resolved POA   No resolved problems to display.       Discharged Condition: admit problems have stabilized     HOSPITAL COURSE:      Initial Presentation:  The patient is a 78 y/o M with CAD, HTN, HLD, HFrEF (20%), COPD, and adenocarcinoma of the lower third of esophagus who presented to the ED from the GI clinic due to worsening of SOB, and a productive cough.    The providers of the history are the patient himself and his wife. Patient c/o persistent cough that is worse overnight for the past couple months, he has home oxygen that he rarely uses, and O2 sat without oxygen is at 93-93% at home. His wife mentions that patient has been filling up 1/2-1/3 of a vomiting bag daily with sputum. Also they state that the patient has become weaker since August, having difficulty ambulating even with a walker, and has had 3 falls since then. Before then he was able to ambulate independently, drive and manage his ADLs. The patient was diagnosed with cancer last year and has received chemotherapy + radiation with shrinkage of the tumor to some extent, however due to some stricture/obstruction he hasn't been  able to have oral intake (even liquids) and therefore has a J-tube in, that per the patient and wife has come out several times and they inserted it back in themselves.    Course of Principle Problem for Admission:  No recent hospital admission or IV antibiotics.   Productive cough + SOB + subjective fever  BL rhonchi on the exam, WBC ~96218 with 90% PMN + infiltrates on CXR     Initial impression was CAP versus aspiration pneumonia  - was give oxygen  - was started on ceftriaxone 1 gr IV qd + moxifloxacin 400 qd in the Ed, and then was continued on the moxifloxacin  - received breathing treatments with PRN duonebs for SOB + cough    Other Medical Problems Addressed in the Hospital:    Goals of care, counseling/discussion     talked about goals of care and palliative options for the patient. He is not interested in any further intervention regarding the cancer. Had palliative care team see the patient and talk about options for hospice.         Cardiac arrhythmia     The patient has ischemic CMP and HF s/p dual chamber ICD in 2014 (single ICD 2006), with Hx of atrial flutter in 2016. He is on amiodarone and metoprolol at home     - continued amiodarone 200 mg qd  - continued metoprolol tartrate 25 mg bid       History of esophageal cancer        The patient has been diagnosed with adenocarcinoma of the lower esophagus 1 year ago and since he wasn't a surgical candidate, has received chemo and radiation therapy.   He is not interested in additional courses of chemotherapy that was previously mentioned to him as an option by Dr. Howard.       CHF, chronic     Ischemic CMP and HFrEF ~20% on the last echo  No signs of volume overload on the exam, monitored closely          Hyperlipidemia LDL goal <70     The most recent lipid profile is from 10/2014  - Cholesterol 146  - LDL 92.8  - HDL 39  - T cholesterol/HDL 3.7  - TG 71     - continued on atorvastatin 40 mg qHS            CONSULTS:   Nutrition  Palliative  medicine    Last CBC/BMP/HgbA1c (if applicable):  Recent Results (from the past 336 hour(s))   CBC auto differential    Collection Time: 10/13/17  3:58 AM   Result Value Ref Range    WBC 13.62 (H) 3.90 - 12.70 K/uL    Hemoglobin 8.9 (L) 14.0 - 18.0 g/dL    Hematocrit 29.5 (L) 40.0 - 54.0 %    Platelets 61 (L) 150 - 350 K/uL   CBC auto differential    Collection Time: 10/12/17 11:24 AM   Result Value Ref Range    WBC 17.35 (H) 3.90 - 12.70 K/uL    Hemoglobin 10.5 (L) 14.0 - 18.0 g/dL    Hematocrit 34.6 (L) 40.0 - 54.0 %    Platelets 57 (L) 150 - 350 K/uL     No results found for this or any previous visit (from the past 336 hour(s)).  No results found for: HGBA1C    Other Pertinent Lab Findings:    CK 47    Disposition:  Home with Hospice     Future Scheduled Appointments:  No future appointments.      Discharge Medication List:       Roni Monsalve Jr.   Home Medication Instructions ISAIAH:[id number]    Printed on:10/14/17 4149   Medication Information                      alprazolam (XANAX) 0.25 MG tablet  TAKE 1 TABLET BY MOUTH 3 TIMES A DAY AS NEEDED FOR ANXIETY             amiodarone (PACERONE) 200 MG Tab  Take 1 tablet (200 mg total) by mouth once daily.             aspirin (ECOTRIN) 81 MG EC tablet  Take 81 mg by mouth once daily.             atorvastatin (LIPITOR) 40 MG tablet  Take 1 tablet (40 mg total) by mouth every evening.             DIPHENHYDRAMINE HCL (DUKE'S SOLUTION)  Take 10 mLs by mouth 4 (four) times daily.             furosemide (LASIX) 20 MG tablet  Take 1 tablet (20 mg total) by mouth daily as needed (dyspnea).             hydrocodone-acetaminophen 5-325mg (NORCO) 5-325 mg per tablet  Take 1 tablet by mouth every 6 (six) hours as needed for Pain.             metoprolol tartrate (LOPRESSOR) 50 MG tablet  Take 1 tablet (50 mg total) by mouth 2 (two) times daily.             moxifloxacin (AVELOX) 400 mg tablet  Take 1 tablet (400 mg total) by mouth once daily. Please deliver to the bedside              nitroGLYCERIN (NITROSTAT) 0.4 MG SL tablet  Place 1 tablet (0.4 mg total) under the tongue every 5 (five) minutes as needed for Chest pain (if more than three needed go to ED or call MD.).             nystatin (MYCOSTATIN) cream               omeprazole (PRILOSEC) 40 MG capsule               ondansetron (ZOFRAN) 4 MG tablet  Take 1 tablet (4 mg total) by mouth every 6 (six) hours.             psyllium (METAMUCIL) packet  Take 1 packet by mouth 3 (three) times daily.             tramadol (ULTRAM) 50 mg tablet  Take 50 mg by mouth 2 (two) times daily as needed for Pain.                  Patient Instructions:    Discharge Procedure Orders  Ambulatory Referral to Hospice   Referral Priority: Routine Referral Type: Hospice   Referral Reason: Specialty Services Required    Requested Specialty: Hospice and Palliative Medicine    Number of Visits Requested: 1          Signing Physician:  Johanny Verde MD

## 2018-10-21 ENCOUNTER — PATIENT MESSAGE (OUTPATIENT)
Dept: HEMATOLOGY/ONCOLOGY | Facility: CLINIC | Age: 78
End: 2018-10-21

## 2018-10-21 ENCOUNTER — PATIENT MESSAGE (OUTPATIENT)
Dept: TRANSPLANT | Facility: CLINIC | Age: 78
End: 2018-10-21

## 2021-11-26 NOTE — ED NOTES
Called pt wife on cell phone as he has been ready for 30 minutes.  States her car if broken down in the garage.  Pt moved to  until they can figure out a plan   RT NOTE:     Pt states she has not used her CPAP for many years, stated it is old  I informed pt we could use our equipment;  @ this time pt declined